# Patient Record
Sex: MALE | Race: BLACK OR AFRICAN AMERICAN | Employment: FULL TIME | URBAN - METROPOLITAN AREA
[De-identification: names, ages, dates, MRNs, and addresses within clinical notes are randomized per-mention and may not be internally consistent; named-entity substitution may affect disease eponyms.]

---

## 2017-03-28 ENCOUNTER — OFFICE VISIT (OUTPATIENT)
Dept: INTERNAL MEDICINE CLINIC | Age: 47
End: 2017-03-28

## 2017-03-28 VITALS
WEIGHT: 211 LBS | SYSTOLIC BLOOD PRESSURE: 120 MMHG | HEART RATE: 73 BPM | DIASTOLIC BLOOD PRESSURE: 70 MMHG | HEIGHT: 73 IN | OXYGEN SATURATION: 98 % | TEMPERATURE: 97.7 F | BODY MASS INDEX: 27.96 KG/M2 | RESPIRATION RATE: 16 BRPM

## 2017-03-28 DIAGNOSIS — E11.9 TYPE 2 DIABETES MELLITUS WITHOUT COMPLICATION, WITHOUT LONG-TERM CURRENT USE OF INSULIN (HCC): Primary | ICD-10-CM

## 2017-03-28 NOTE — MR AVS SNAPSHOT
Visit Information Date & Time Provider Department Dept. Phone Encounter #  
 3/28/2017 11:15 AM William Regalado MD [facility] [phone] [id number] Follow-up Instructions Return in about 3 months (around 6/28/2017). Follow-up and Disposition History Upcoming Health Maintenance Date Due Pneumococcal 19-64 Medium Risk (1 of 1 - PPSV23) 8/4/1987 EYE EXAM RETINAL OR DILATED Q1 4/16/2015 FOOT EXAM Q1 12/2/2016 MICROALBUMIN Q1 6/28/2017 LIPID PANEL Q1 6/28/2017 HEMOGLOBIN A1C Q6M 6/29/2017 DTaP/Tdap/Td series (2 - Td) 8/5/2020 Allergies as of 3/28/2017  Review Complete On: 3/28/2017 By: Nikki Carmichael Severity Noted Reaction Type Reactions Lipitor [Atorvastatin]  05/28/2015    Myalgia CK elevated. Current Immunizations  Reviewed on 8/5/2011 Name Date TDAP Vaccine 8/5/2010 Not reviewed this visit You Were Diagnosed With   
  
 Codes Comments Type 2 diabetes mellitus without complication, without long-term current use of insulin (HCC)    -  Primary ICD-10-CM: E11.9 ICD-9-CM: 250.00 Vitals BP Pulse Temp Resp Height(growth percentile) Weight(growth percentile) 120/70 (BP 1 Location: Left arm, BP Patient Position: At rest) 73 97.7 °F (36.5 °C) (Oral) 16 6' 1\" (1.854 m) 211 lb (95.7 kg) SpO2 BMI Smoking Status 98% 27.84 kg/m2 Never Smoker Vitals History BMI and BSA Data Body Mass Index Body Surface Area  
 27.84 kg/m 2 2.22 m 2 Preferred Pharmacy Pharmacy Name Phone 99 [facility], [facility] [phone] Your Updated Medication List  
  
   
This list is accurate as of: 3/28/17 11:49 AM.  Always use your most recent med list.  
  
  
  
  
 glimepiride 2 mg tablet Commonly known as:  AMARYL  
TAKE 1 TABLET BY MOUTH EVERY MORNING  
  
 glucose blood VI test strips strip Commonly known as:  ONETOUCH ULTRA TEST Check blood glucose once dialy  
  
 metFORMIN  mg tablet Commonly known as:  GLUCOPHAGE XR  
TAKE 4 TABLETS BY MOUTH DAILY WITH DINNER  
  
 multivitamin tablet Commonly known as:  ONE A DAY Take 1 Tab by mouth daily. valACYclovir 500 mg tablet Commonly known as:  VALTREX Take 1 Tab by mouth daily. VIAGRA 100 mg tablet Generic drug:  sildenafil citrate TAKE 1 TABLET BY MOUTH AS NEEDED -Do NOT take with nitrates or within 4 hours of an alpha BLOCKER We Performed the Following HEMOGLOBIN A1C WITH EAG [91803 CPT(R)] METABOLIC PANEL, COMPREHENSIVE [98556 CPT(R)] Follow-up Instructions Return in about 3 months (around 6/28/2017). Introducing [facility] SERVICES! Dear Hanny Rodas: Thank you for requesting a Widespace account. Our records indicate that you already have an active Widespace account. You can access your account anytime at [email or web address]/[email or web address] Did you know that you can access your hospital and ER discharge instructions at any time in Widespace? You can also review all of your test results from your hospital stay or ER visit. Additional Information If you have questions, please visit the Frequently Asked Questions section of the Widespace website at [email or web address] Remember, Widespace is NOT to be used for urgent needs. For medical emergencies, dial 911. Now available from your iPhone and Android! Please provide this summary of care documentation to your next provider. Your primary care clinician is listed as [phone] [address] [place] [address] [address]. If you have any questions after today's visit, please call [phone].

## 2017-03-28 NOTE — PROGRESS NOTES
HISTORY OF PRESENT ILLNESS  Franc Wheeler is a 48 y.o. male. HPI  Here for dm. He is well controlled on oral agents. He is active. He runs daily. Past Medical History:   Diagnosis Date    Acute superficial venous thrombosis of lower extremity 8/23/13    LLE, extensive to greater saphenous vein    Diabetes (HCC)     Genital HSV     Hypercholesterolemia      Current Outpatient Prescriptions   Medication Sig    glimepiride (AMARYL) 2 mg tablet TAKE 1 TABLET BY MOUTH EVERY MORNING    VIAGRA 100 mg tablet TAKE 1 TABLET BY MOUTH AS NEEDED -Do NOT take with nitrates or within 4 hours of an alpha BLOCKER    metFORMIN ER (GLUCOPHAGE XR) 500 mg tablet TAKE 4 TABLETS BY MOUTH DAILY WITH DINNER    multivitamin (ONE A DAY) tablet Take 1 Tab by mouth daily.  valACYclovir (VALTREX) 500 mg tablet Take 1 Tab by mouth daily.  glucose blood VI test strips (ONE TOUCH ULTRA TEST) strip Check blood glucose once dialy     No current facility-administered medications for this visit. Review of Systems   All other systems reviewed and are negative. Visit Vitals    /70 (BP 1 Location: Left arm, BP Patient Position: At rest)    Pulse 73    Temp 97.7 °F (36.5 °C) (Oral)    Resp 16    Ht 6' 1\" (1.854 m)    Wt 211 lb (95.7 kg)    SpO2 98%    BMI 27.84 kg/m2       Physical Exam   Constitutional: He appears well-developed and well-nourished. Nursing note and vitals reviewed. Lab Results   Component Value Date/Time    Hemoglobin A1c 6.6 12/29/2016 11:25 AM         ASSESSMENT and Nabeel Valverde was seen today for blood pressure check. Diagnoses and all orders for this visit:    Type 2 diabetes mellitus without complication, without long-term current use of insulin (HCC)  -     HEMOGLOBIN A1C WITH EAG  -     METABOLIC PANEL, COMPREHENSIVE  The current medical regimen is effective;  continue present plan and medications.     Reviewed plan of care with the patient who has provided input and agrees with the goals

## 2017-03-29 LAB
ALBUMIN SERPL-MCNC: 4.7 G/DL (ref 3.5–5.5)
ALBUMIN/GLOB SERPL: 1.7 {RATIO} (ref 1.2–2.2)
ALP SERPL-CCNC: 52 IU/L (ref 39–117)
ALT SERPL-CCNC: 35 IU/L (ref 0–44)
AST SERPL-CCNC: 21 IU/L (ref 0–40)
BILIRUB SERPL-MCNC: 1 MG/DL (ref 0–1.2)
BUN SERPL-MCNC: 11 MG/DL (ref 6–24)
BUN/CREAT SERPL: 12 (ref 9–20)
CALCIUM SERPL-MCNC: 9.5 MG/DL (ref 8.7–10.2)
CHLORIDE SERPL-SCNC: 98 MMOL/L (ref 96–106)
CO2 SERPL-SCNC: 25 MMOL/L (ref 18–29)
CREAT SERPL-MCNC: 0.92 MG/DL (ref 0.76–1.27)
EST. AVERAGE GLUCOSE BLD GHB EST-MCNC: 146 MG/DL
GLOBULIN SER CALC-MCNC: 2.7 G/DL (ref 1.5–4.5)
GLUCOSE SERPL-MCNC: 148 MG/DL (ref 65–99)
HBA1C MFR BLD: 6.7 % (ref 4.8–5.6)
POTASSIUM SERPL-SCNC: 4.6 MMOL/L (ref 3.5–5.2)
PROT SERPL-MCNC: 7.4 G/DL (ref 6–8.5)
SODIUM SERPL-SCNC: 138 MMOL/L (ref 134–144)

## 2017-05-22 RX ORDER — GLIMEPIRIDE 2 MG/1
TABLET ORAL
Qty: 90 TAB | Refills: 0 | Status: SHIPPED | OUTPATIENT
Start: 2017-05-22 | End: 2017-08-21 | Stop reason: SDUPTHER

## 2017-05-22 RX ORDER — METFORMIN HYDROCHLORIDE 500 MG/1
TABLET, EXTENDED RELEASE ORAL
Qty: 360 TAB | Refills: 0 | Status: SHIPPED | OUTPATIENT
Start: 2017-05-22 | End: 2017-08-21 | Stop reason: SDUPTHER

## 2017-06-28 ENCOUNTER — OFFICE VISIT (OUTPATIENT)
Dept: INTERNAL MEDICINE CLINIC | Age: 47
End: 2017-06-28

## 2017-06-28 VITALS
HEART RATE: 84 BPM | SYSTOLIC BLOOD PRESSURE: 110 MMHG | BODY MASS INDEX: 27.5 KG/M2 | TEMPERATURE: 97.6 F | DIASTOLIC BLOOD PRESSURE: 80 MMHG | OXYGEN SATURATION: 99 % | WEIGHT: 208.4 LBS

## 2017-06-28 DIAGNOSIS — E11.9 TYPE 2 DIABETES MELLITUS WITHOUT COMPLICATION, WITHOUT LONG-TERM CURRENT USE OF INSULIN (HCC): Primary | ICD-10-CM

## 2017-06-28 NOTE — PROGRESS NOTES
HISTORY OF PRESENT ILLNESS  Scar Noel is a 48 y.o. male. HPI  Here for dm. He is well controlled on oral agents. He is active running daily and is careful with diet. Past Medical History:   Diagnosis Date    Acute superficial venous thrombosis of lower extremity 8/23/13    LLE, extensive to greater saphenous vein    Diabetes (HCC)     Genital HSV     Hypercholesterolemia      Current Outpatient Prescriptions   Medication Sig    glimepiride (AMARYL) 2 mg tablet TAKE 1 TABLET BY MOUTH EVERY MORNING    metFORMIN ER (GLUCOPHAGE XR) 500 mg tablet TAKE 4 TABLETS BY MOUTH DAILY WITH DINNER    VIAGRA 100 mg tablet TAKE 1 TABLET BY MOUTH AS NEEDED -Do NOT take with nitrates or within 4 hours of an alpha BLOCKER    multivitamin (ONE A DAY) tablet Take 1 Tab by mouth daily.  valACYclovir (VALTREX) 500 mg tablet Take 1 Tab by mouth daily.  glucose blood VI test strips (ONE TOUCH ULTRA TEST) strip Check blood glucose once dialy     No current facility-administered medications for this visit. Review of Systems   All other systems reviewed and are negative. Visit Vitals    /80 (BP 1 Location: Left arm, BP Patient Position: At rest)    Pulse 84    Temp 97.6 °F (36.4 °C) (Oral)    Wt 208 lb 6.4 oz (94.5 kg)    SpO2 99%    BMI 27.5 kg/m2       Physical Exam   Constitutional: He appears well-developed and well-nourished. Psychiatric: He has a normal mood and affect. His behavior is normal. Thought content normal.   Nursing note and vitals reviewed. Lab Results   Component Value Date/Time    Hemoglobin A1c 6.7 03/28/2017 12:01 PM       ASSESSMENT and Alona Alpers was seen today for diabetes.     Diagnoses and all orders for this visit:    Type 2 diabetes mellitus without complication, without long-term current use of insulin (HCC)  -     HEMOGLOBIN A1C WITH EAG  -     METABOLIC PANEL, COMPREHENSIVE  -     MICROALBUMIN, UR, RAND W/ MICROALBUMIN/CREA RATIO  The current medical regimen is effective;  continue present plan and medications.       Reviewed plan of care with the patient who has provided input and agrees with the goals

## 2017-06-28 NOTE — PROGRESS NOTES
1. Have you been to the ER, urgent care clinic since your last visit? Hospitalized since your last visit? No    2. Have you seen or consulted any other health care providers outside of the [facility] [place] [facility]"" since your last visit? Include any pap smears or colon screening.  No   Chief Complaint   Patient presents with    Diabetes     3 months follow up     Fasting

## 2017-06-28 NOTE — MR AVS SNAPSHOT
Visit Information Date & Time Provider Department Dept. Phone Encounter #  
 6/28/2017 11:15 AM Noemi Ferro MD [facility] [phone] [id number] Follow-up Instructions Return in about 3 months (around 9/28/2017). Follow-up and Disposition History Upcoming Health Maintenance Date Due Pneumococcal 19-64 Medium Risk (1 of 1 - PPSV23) 8/4/1987 EYE EXAM RETINAL OR DILATED Q1 4/16/2015 FOOT EXAM Q1 12/2/2016 MICROALBUMIN Q1 6/28/2017 LIPID PANEL Q1 6/28/2017 INFLUENZA AGE 9 TO ADULT 8/1/2017 HEMOGLOBIN A1C Q6M 9/28/2017 DTaP/Tdap/Td series (2 - Td) 8/5/2020 Allergies as of 6/28/2017  Review Complete On: 6/28/2017 By: Sangeeta Suresh Severity Noted Reaction Type Reactions Lipitor [Atorvastatin]  05/28/2015    Myalgia CK elevated. Current Immunizations  Reviewed on 8/5/2011 Name Date TDAP Vaccine 8/5/2010 Not reviewed this visit You Were Diagnosed With   
  
 Codes Comments Type 2 diabetes mellitus without complication, without long-term current use of insulin (HCC)    -  Primary ICD-10-CM: E11.9 ICD-9-CM: 250.00 Vitals BP Pulse Temp Weight(growth percentile) SpO2 BMI  
 110/80 (BP 1 Location: Left arm, BP Patient Position: At rest) 84 97.6 °F (36.4 °C) (Oral) 208 lb 6.4 oz (94.5 kg) 99% 27.5 kg/m2 Smoking Status Never Smoker Vitals History BMI and BSA Data Body Mass Index Body Surface Area  
 27.5 kg/m 2 2.21 m 2 Preferred Pharmacy Pharmacy Name Phone [address] [place] [address], [facility] [phone] Your Updated Medication List  
  
   
This list is accurate as of: 6/28/17 11:28 AM.  Always use your most recent med list.  
  
  
  
  
 glimepiride 2 mg tablet Commonly known as:  AMARYL  
TAKE 1 TABLET BY MOUTH EVERY MORNING  
  
 glucose blood VI test strips strip Commonly known as:  ONETOUCH ULTRA TEST Check blood glucose once dialy  
  
 metFORMIN  mg tablet Commonly known as:  GLUCOPHAGE XR  
TAKE 4 TABLETS BY MOUTH DAILY WITH DINNER  
  
 multivitamin tablet Commonly known as:  ONE A DAY Take 1 Tab by mouth daily. valACYclovir 500 mg tablet Commonly known as:  VALTREX Take 1 Tab by mouth daily. VIAGRA 100 mg tablet Generic drug:  sildenafil citrate TAKE 1 TABLET BY MOUTH AS NEEDED -Do NOT take with nitrates or within 4 hours of an alpha BLOCKER We Performed the Following HEMOGLOBIN A1C WITH EAG [63090 CPT(R)] METABOLIC PANEL, COMPREHENSIVE [39515 CPT(R)] MICROALBUMIN, UR, RAND W/ MICROALBUMIN/CREA RATIO [id number] CPT(R)] Follow-up Instructions Return in about 3 months (around 9/28/2017). Introducing [facility] & HEALTH SERVICES! Dear Pola Vicente: Thank you for requesting a Pocits account. Our records indicate that you already have an active Pocits account. You can access your account anytime at [email or web address] Did you know that you can access your hospital and ER discharge instructions at any time in Pocits? You can also review all of your test results from your hospital stay or ER visit. Additional Information If you have questions, please visit the Frequently Asked Questions section of the Pocits website at [email or web address] Remember, Pocits is NOT to be used for urgent needs. For medical emergencies, dial 911. Now available from your iPhone and Android! Please provide this summary of care documentation to your next provider. Your primary care clinician is listed as [phone] [address] [place] [address] [address]. If you have any questions after today's visit, please call [phone].

## 2017-06-29 LAB
ALBUMIN SERPL-MCNC: 4.8 G/DL (ref 3.5–5.5)
ALBUMIN/CREAT UR: 8.1 MG/G CREAT (ref 0–30)
ALBUMIN/GLOB SERPL: 1.7 {RATIO} (ref 1.2–2.2)
ALP SERPL-CCNC: 57 IU/L (ref 39–117)
ALT SERPL-CCNC: 18 IU/L (ref 0–44)
AST SERPL-CCNC: 20 IU/L (ref 0–40)
BILIRUB SERPL-MCNC: 0.6 MG/DL (ref 0–1.2)
BUN SERPL-MCNC: 14 MG/DL (ref 6–24)
BUN/CREAT SERPL: 15 (ref 9–20)
CALCIUM SERPL-MCNC: 9.6 MG/DL (ref 8.7–10.2)
CHLORIDE SERPL-SCNC: 98 MMOL/L (ref 96–106)
CO2 SERPL-SCNC: 23 MMOL/L (ref 18–29)
CREAT SERPL-MCNC: 0.93 MG/DL (ref 0.76–1.27)
CREAT UR-MCNC: 270.7 MG/DL
EST. AVERAGE GLUCOSE BLD GHB EST-MCNC: 148 MG/DL
GLOBULIN SER CALC-MCNC: 2.8 G/DL (ref 1.5–4.5)
GLUCOSE SERPL-MCNC: 134 MG/DL (ref 65–99)
HBA1C MFR BLD: 6.8 % (ref 4.8–5.6)
MICROALBUMIN UR-MCNC: 21.9 UG/ML
POTASSIUM SERPL-SCNC: 4.4 MMOL/L (ref 3.5–5.2)
PROT SERPL-MCNC: 7.6 G/DL (ref 6–8.5)
SODIUM SERPL-SCNC: 139 MMOL/L (ref 134–144)

## 2017-08-22 RX ORDER — METFORMIN HYDROCHLORIDE 500 MG/1
TABLET, EXTENDED RELEASE ORAL
Qty: 180 TAB | Refills: 1 | Status: SHIPPED | OUTPATIENT
Start: 2017-08-22 | End: 2017-12-04 | Stop reason: SDUPTHER

## 2017-08-22 RX ORDER — GLIMEPIRIDE 2 MG/1
TABLET ORAL
Qty: 90 TAB | Refills: 1 | Status: SHIPPED | OUTPATIENT
Start: 2017-08-22 | End: 2018-03-16 | Stop reason: SDUPTHER

## 2017-10-02 ENCOUNTER — OFFICE VISIT (OUTPATIENT)
Dept: INTERNAL MEDICINE CLINIC | Age: 47
End: 2017-10-02

## 2017-10-02 VITALS
DIASTOLIC BLOOD PRESSURE: 70 MMHG | WEIGHT: 221.8 LBS | TEMPERATURE: 97.7 F | HEIGHT: 73 IN | SYSTOLIC BLOOD PRESSURE: 113 MMHG | BODY MASS INDEX: 29.4 KG/M2 | OXYGEN SATURATION: 96 % | RESPIRATION RATE: 16 BRPM | HEART RATE: 79 BPM

## 2017-10-02 DIAGNOSIS — E11.9 TYPE 2 DIABETES MELLITUS WITHOUT COMPLICATION, WITHOUT LONG-TERM CURRENT USE OF INSULIN (HCC): Primary | ICD-10-CM

## 2017-10-02 DIAGNOSIS — Z78.9 STATIN INTOLERANCE: ICD-10-CM
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
## 2017-10-02 NOTE — PROGRESS NOTES
HISTORY OF PRESENT ILLNESS  Gisele Mota is a 50 y.o. male. HPI  Here for DM. He is well controlled on oral agents due for labs. He is statin intolerant . He is active but not running again yet. He has loosened up his diet some.,  Past Medical History:   Diagnosis Date    Acute superficial venous thrombosis of lower extremity 8/23/13    LLE, extensive to greater saphenous vein    Diabetes (HCC)     Genital HSV     Hypercholesterolemia      Current Outpatient Prescriptions   Medication Sig    glimepiride (AMARYL) 2 mg tablet TAKE 1 TABLET BY MOUTH EVERY MORNING    metFORMIN ER (GLUCOPHAGE XR) 500 mg tablet TAKE 4 TABLETS BY MOUTH DAILY WITH DINNER    VIAGRA 100 mg tablet TAKE 1 TABLET BY MOUTH AS NEEDED -Do NOT take with nitrates or within 4 hours of an alpha BLOCKER    valACYclovir (VALTREX) 500 mg tablet Take 1 Tab by mouth daily.  glucose blood VI test strips (ONE TOUCH ULTRA TEST) strip Check blood glucose once dialy    multivitamin (ONE A DAY) tablet Take 1 Tab by mouth daily. No current facility-administered medications for this visit. Review of Systems   All other systems reviewed and are negative. Visit Vitals    /70 (BP 1 Location: Left arm, BP Patient Position: At rest)    Pulse 79    Temp 97.7 °F (36.5 °C) (Oral)    Resp 16    Ht 6' 1\" (1.854 m)    Wt 221 lb 12.8 oz (100.6 kg)    SpO2 96%    BMI 29.26 kg/m2       Physical Exam   Constitutional: He appears well-developed and well-nourished. Musculoskeletal:   Foot exam - bilateral normal; no swelling, tenderness or skin or vascular lesions. Color and temperature is normal. Sensation is intact. Peripheral pulses are palpable. Toenails are normal.     Nursing note and vitals reviewed. Lab Results   Component Value Date/Time    Hemoglobin A1c 6.8 06/28/2017 11:38 AM       ASSESSMENT and PLAN  Diagnoses and all orders for this visit:    1.  Type 2 diabetes mellitus without complication, without long-term current use of insulin (HCC)  -     HEMOGLOBIN A1C WITH EAG  -     METABOLIC PANEL, COMPREHENSIVE  -     REFERRAL TO OPHTHALMOLOGY  -      DIABETES FOOT EXAM  The current medical regimen is effective;  continue present plan and medications.     2. Statin intolerance    Reviewed plan of care with the patient who has provided input and agrees with the goals

## 2017-10-02 NOTE — MR AVS SNAPSHOT
Visit Information Date & Time Provider Department Dept. Phone Encounter #  
 10/2/2017 10:45 AM Leigh Michele MD [facility] [phone] [id number] Follow-up Instructions Return in about 3 months (around 1/2/2018). Upcoming Health Maintenance Date Due Pneumococcal 19-64 Medium Risk (1 of 1 - PPSV23) 8/4/1987 EYE EXAM RETINAL OR DILATED Q1 4/16/2015 FOOT EXAM Q1 12/2/2016 LIPID PANEL Q1 6/28/2017 INFLUENZA AGE 9 TO ADULT 8/1/2017 HEMOGLOBIN A1C Q6M 12/28/2017 MICROALBUMIN Q1 6/28/2018 DTaP/Tdap/Td series (2 - Td) 8/5/2020 Allergies as of 10/2/2017  Review Complete On: 10/2/2017 By: Miller Parnell Severity Noted Reaction Type Reactions Lipitor [Atorvastatin]  05/28/2015    Myalgia CK elevated. Current Immunizations  Reviewed on 8/5/2011 Name Date TDAP Vaccine 8/5/2010 Not reviewed this visit You Were Diagnosed With   
  
 Codes Comments Type 2 diabetes mellitus without complication, without long-term current use of insulin (HCC)    -  Primary ICD-10-CM: E11.9 ICD-9-CM: 250.00 Statin intolerance     ICD-10-CM: Z78.9 ICD-9-CM: 995.27 Vitals BP Pulse Temp Resp Height(growth percentile) Weight(growth percentile) 133/87 (BP 1 Location: Left arm, BP Patient Position: At rest) 79 97.7 °F (36.5 °C) (Oral) 16 6' 1\" (1.854 m) 221 lb 12.8 oz (100.6 kg) SpO2 BMI Smoking Status 96% 29.26 kg/m2 Never Smoker BMI and BSA Data Body Mass Index Body Surface Area  
 29.26 kg/m 2 2.28 m 2 Preferred Pharmacy Pharmacy Name Phone 99 [facility], [facility] [phone] Your Updated Medication List  
  
   
This list is accurate as of: 10/2/17 11:03 AM.  Always use your most recent med list.  
  
  
  
  
 glimepiride 2 mg tablet Commonly known as:  AMARYL  
TAKE 1 TABLET BY MOUTH EVERY MORNING  
  
 glucose blood VI test strips strip Commonly known as:  ONETOUCH ULTRA TEST Check blood glucose once dialy  
  
 metFORMIN  mg tablet Commonly known as:  GLUCOPHAGE XR  
TAKE 4 TABLETS BY MOUTH DAILY WITH DINNER  
  
 multivitamin tablet Commonly known as:  ONE A DAY Take 1 Tab by mouth daily. valACYclovir 500 mg tablet Commonly known as:  VALTREX Take 1 Tab by mouth daily. VIAGRA 100 mg tablet Generic drug:  sildenafil citrate TAKE 1 TABLET BY MOUTH AS NEEDED -Do NOT take with nitrates or within 4 hours of an alpha BLOCKER We Performed the Following HEMOGLOBIN A1C WITH EAG [56931 CPT(R)]  DIABETES FOOT EXAM [HM7 Custom] METABOLIC PANEL, COMPREHENSIVE [06940 CPT(R)] REFERRAL TO OPHTHALMOLOGY [REF57 Custom] Follow-up Instructions Return in about 3 months (around 1/2/2018). Referral Information Referral ID Referred By Referred To  
  
 [id number] Jacque Lindsey Not Available Visits Status Start Date End Date 1 New Request 10/2/17 10/2/18 If your referral has a status of pending review or denied, additional information will be sent to support the outcome of this decision. Introducing [facility] & HEALTH SERVICES! Dear Lilliam Irizarry: Thank you for requesting a TheShoppingPro account. Our records indicate that you already have an active TheShoppingPro account. You can access your account anytime at [email or web address] Did you know that you can access your hospital and ER discharge instructions at any time in TheShoppingPro? You can also review all of your test results from your hospital stay or ER visit. Additional Information If you have questions, please visit the Frequently Asked Questions section of the TheShoppingPro website at [email or web address] Remember, TheShoppingPro is NOT to be used for urgent needs. For medical emergencies, dial 911. Now available from your iPhone and Android! Please provide this summary of care documentation to your next provider. Your primary care clinician is listed as [phone] [address] [place] [address] [address]. If you have any questions after today's visit, please call [phone].

## 2017-10-03 LAB
ALBUMIN SERPL-MCNC: 4.6 G/DL (ref 3.5–5.5)
ALBUMIN/GLOB SERPL: 1.7 {RATIO} (ref 1.2–2.2)
ALP SERPL-CCNC: 54 IU/L (ref 39–117)
ALT SERPL-CCNC: 23 IU/L (ref 0–44)
AST SERPL-CCNC: 22 IU/L (ref 0–40)
BILIRUB SERPL-MCNC: 0.9 MG/DL (ref 0–1.2)
BUN SERPL-MCNC: 16 MG/DL (ref 6–24)
BUN/CREAT SERPL: 16 (ref 9–20)
CALCIUM SERPL-MCNC: 9.3 MG/DL (ref 8.7–10.2)
CHLORIDE SERPL-SCNC: 101 MMOL/L (ref 96–106)
CO2 SERPL-SCNC: 24 MMOL/L (ref 18–29)
CREAT SERPL-MCNC: 1 MG/DL (ref 0.76–1.27)
EST. AVERAGE GLUCOSE BLD GHB EST-MCNC: 146 MG/DL
GLOBULIN SER CALC-MCNC: 2.7 G/DL (ref 1.5–4.5)
GLUCOSE SERPL-MCNC: 169 MG/DL (ref 65–99)
HBA1C MFR BLD: 6.7 % (ref 4.8–5.6)
POTASSIUM SERPL-SCNC: 4.5 MMOL/L (ref 3.5–5.2)
PROT SERPL-MCNC: 7.3 G/DL (ref 6–8.5)
SODIUM SERPL-SCNC: 140 MMOL/L (ref 134–144)

## 2017-12-04 RX ORDER — METFORMIN HYDROCHLORIDE 500 MG/1
TABLET, EXTENDED RELEASE ORAL
Qty: 360 TAB | Refills: 3 | Status: SHIPPED | OUTPATIENT
Start: 2017-12-04 | End: 2018-12-21 | Stop reason: SDUPTHER

## 2017-12-04 RX ORDER — SILDENAFIL CITRATE 100 MG/1
TABLET, FILM COATED ORAL
Qty: 15 TAB | Refills: 10 | Status: SHIPPED | OUTPATIENT
Start: 2017-12-04 | End: 2018-03-27 | Stop reason: SDUPTHER

## 2018-03-19 RX ORDER — GLIMEPIRIDE 2 MG/1
TABLET ORAL
Qty: 90 TAB | Refills: 1 | Status: SHIPPED | OUTPATIENT
Start: 2018-03-19 | End: 2018-08-29 | Stop reason: SDUPTHER

## 2018-03-27 ENCOUNTER — OFFICE VISIT (OUTPATIENT)
Dept: INTERNAL MEDICINE CLINIC | Age: 48
End: 2018-03-27

## 2018-03-27 VITALS
HEART RATE: 90 BPM | HEIGHT: 73 IN | SYSTOLIC BLOOD PRESSURE: 114 MMHG | WEIGHT: 223 LBS | OXYGEN SATURATION: 98 % | RESPIRATION RATE: 16 BRPM | DIASTOLIC BLOOD PRESSURE: 78 MMHG | BODY MASS INDEX: 29.55 KG/M2

## 2018-03-27 DIAGNOSIS — Z12.5 ENCOUNTER FOR PROSTATE CANCER SCREENING: ICD-10-CM

## 2018-03-27 DIAGNOSIS — M79.10 MYALGIA: ICD-10-CM

## 2018-03-27 DIAGNOSIS — E11.9 TYPE 2 DIABETES MELLITUS WITHOUT COMPLICATION, WITHOUT LONG-TERM CURRENT USE OF INSULIN (HCC): ICD-10-CM

## 2018-03-27 DIAGNOSIS — Z78.9 STATIN INTOLERANCE: Primary | ICD-10-CM

## 2018-03-27 DIAGNOSIS — N52.1 ERECTILE DYSFUNCTION DUE TO DISEASES CLASSIFIED ELSEWHERE: ICD-10-CM

## 2018-03-27 RX ORDER — SILDENAFIL 100 MG/1
TABLET, FILM COATED ORAL
Qty: 50 TAB | Refills: 3 | Status: SHIPPED | OUTPATIENT
Start: 2018-03-27 | End: 2019-04-09 | Stop reason: SDUPTHER

## 2018-03-27 NOTE — PROGRESS NOTES
Chief Complaint   Patient presents with    Follow-up     3 mon    Ear Fullness     Diabetic ROS - medication compliance: compliant all of the time, diabetic diet compliance: compliant all of the time, home glucose monitoring: is performed regularly. New concerns: had ear surgery last year. Diabetic exam: heart sounds normal rate, regular rhythm, normal S1, S2, no murmurs, rubs, clicks or gallops. Lab review:   Results for orders placed or performed in visit on 10/02/17   HEMOGLOBIN A1C WITH EAG   Result Value Ref Range    Hemoglobin A1c 6.7 (H) 4.8 - 5.6 %    Estimated average glucose 242 mg/dL   METABOLIC PANEL, COMPREHENSIVE   Result Value Ref Range    Glucose 169 (H) 65 - 99 mg/dL    BUN 16 6 - 24 mg/dL    Creatinine 1.00 0.76 - 1.27 mg/dL    GFR est non-AA 88 >59 mL/min/1.73    GFR est  >59 mL/min/1.73    BUN/Creatinine ratio 16 9 - 20    Sodium 140 134 - 144 mmol/L    Potassium 4.5 3.5 - 5.2 mmol/L    Chloride 101 96 - 106 mmol/L    CO2 24 18 - 29 mmol/L    Calcium 9.3 8.7 - 10.2 mg/dL    Protein, total 7.3 6.0 - 8.5 g/dL    Albumin 4.6 3.5 - 5.5 g/dL    GLOBULIN, TOTAL 2.7 1.5 - 4.5 g/dL    A-G Ratio 1.7 1.2 - 2.2    Bilirubin, total 0.9 0.0 - 1.2 mg/dL    Alk. phosphatase 54 39 - 117 IU/L    AST (SGOT) 22 0 - 40 IU/L    ALT (SGPT) 23 0 - 44 IU/L     . Assessment: Diabetes Mellitus: stable. Plan: See orders for this visit as documented in the electronic medical record. Diabetic issues reviewed with him: diabetic diet discussed in detail, written exchange diet given, low cholesterol diet, weight control and daily exercise discussed and home glucose monitoring emphasized. Lab Results   Component Value Date/Time    Hemoglobin A1c 6.7 (H) 10/02/2017 11:18 AM     See ENT as issues post op require the help of ENT      Diagnoses and all orders for this visit:    1. Statin intolerance    2. Myalgia    3.  Type 2 diabetes mellitus without complication, without long-term current use of insulin ([facility] [address]  -     LIPID PANEL  -     CBC WITH AUTOMATED DIFF  -     METABOLIC PANEL, COMPREHENSIVE  -     MICROALBUMIN, UR, RAND W/ MICROALB/CREAT RATIO    4. Erectile dysfunction due to diseases classified elsewhere    5.  Encounter for prostate cancer screening  -     PSA W/ REFLX FREE PSA    Other orders  -     sildenafil citrate (VIAGRA) 100 mg tablet; TAKE 1 TABLET BY MOUTH AS NEEDED -Do NOT take with nitrates or within 4 hours of an alpha BLOCKER

## 2018-03-27 NOTE — PROGRESS NOTES
Coordination of Care Questions    1. Have you been to the ER, urgent care clinic since your last visit? No       Hospitalized since your last visit? No    2. Have you seen or consulted any other health care providers outside of the [address] [place] [address] since your last visit? Include any pap smears or colon screening.  No

## 2018-03-27 NOTE — MR AVS SNAPSHOT
[address] [place] [address] [address] 
[phone] Patient: Gina Ayala. MRN:  Anand Masters Visit Information Date & Time Provider Department Dept. Phone Encounter #  
 3/27/2018  2:00 PM Lionel Huff MD [facility] [phone] [id number] Your Appointments 6/26/2018 11:00 AM  
ROUTINE CARE with Lionel Huff MD  
[facility] Appt Note: R F/U  
 [place] [address] [facility] [address]  
[address] [facility] 121 [facility] 7 [address] Upcoming Health Maintenance Date Due Pneumococcal 19-64 Medium Risk (1 of 1 - PPSV23) 8/4/1987 EYE EXAM RETINAL OR DILATED Q1 4/16/2015 LIPID PANEL Q1 6/28/2017 Influenza Age 5 to Adult 8/1/2017 HEMOGLOBIN A1C Q6M 4/2/2018 MICROALBUMIN Q1 6/28/2018 FOOT EXAM Q1 10/2/2018 DTaP/Tdap/Td series (2 - Td) 8/5/2020 Allergies as of 3/27/2018  Review Complete On: 3/27/2018 By: Freda Sullivan LPN Severity Noted Reaction Type Reactions Lipitor [Atorvastatin]  05/28/2015    Myalgia CK elevated. Current Immunizations  Reviewed on 8/5/2011 Name Date TDAP Vaccine 8/5/2010 Not reviewed this visit You Were Diagnosed With   
  
 Codes Comments Statin intolerance    -  Primary ICD-10-CM: Z78.9 ICD-9-CM: 995.27 Myalgia     ICD-10-CM: M79.1 ICD-9-CM: 729.1 Type 2 diabetes mellitus without complication, without long-term current use of insulin (HCC)     ICD-10-CM: E11.9 ICD-9-CM: 250.00 Erectile dysfunction due to diseases classified elsewhere     ICD-10-CM: N52.1 ICD-9-CM: 607.84 Encounter for prostate cancer screening     ICD-10-CM: Z12.5 ICD-9-CM: V76.44 Vitals BP Pulse Resp Height(growth percentile) Weight(growth percentile) SpO2 114/78 90 16 6' 1\" (1.854 m) 223 lb (101.2 kg) 98% BMI Smoking Status 29.42 kg/m2 Never Smoker Vitals History BMI and BSA Data Body Mass Index Body Surface Area  
 29.42 kg/m 2 2.28 m 2 Preferred Pharmacy Pharmacy Name Phone [facility], [facility] [place] Mar [phone] Your Updated Medication List  
  
   
This list is accurate as of 3/27/18  2:46 PM.  Always use your most recent med list.  
  
  
  
  
 glimepiride 2 mg tablet Commonly known as:  AMARYL  
TAKE 1 TABLET BY MOUTH EVERY MORNING  
  
 glucose blood VI test strips strip Commonly known as:  ONETOUCH ULTRA TEST Check blood glucose once dialy  
  
 metFORMIN  mg tablet Commonly known as:  GLUCOPHAGE XR  
TAKE 4 TABLETS BY MOUTH DAILY WITH DINNER  
  
 multivitamin tablet Commonly known as:  ONE A DAY Take 1 Tab by mouth daily. sildenafil citrate 100 mg tablet Commonly known as:  VIAGRA TAKE 1 TABLET BY MOUTH AS NEEDED -Do NOT take with nitrates or within 4 hours of an alpha BLOCKER  
  
 valACYclovir 500 mg tablet Commonly known as:  VALTREX Take 1 Tab by mouth daily. Prescriptions Sent to Pharmacy Refills  
 sildenafil citrate (VIAGRA) 100 mg tablet 3 Sig: TAKE 1 TABLET BY MOUTH AS NEEDED -Do NOT take with nitrates or within 4 hours of an alpha BLOCKER Class: Normal  
 Pharmacy: [address] [place] [address] 43, [place]  #: [phone] We Performed the Following CBC WITH AUTOMATED DIFF [42654 CPT(R)] LIPID PANEL [84277 CPT(R)] METABOLIC PANEL, COMPREHENSIVE [86361 CPT(R)] MICROALBUMIN, UR, RAND W/ MICROALB/CREAT RATIO A1305406 CPT(R)] PSA W/ REFLX FREE PSA [98103 CPT(R)] Introducing [facility]! Dear Robyn Moreno: Thank you for requesting a NephroGenex account. Our records indicate that you already have an active NephroGenex account.   You can access your account anytime at [email or web address] Did you know that you can access your hospital and ER discharge instructions at any time in Storm Exchange? You can also review all of your test results from your hospital stay or ER visit. Additional Information If you have questions, please visit the Frequently Asked Questions section of the Storm Exchange website at [email or web address] Remember, Storm Exchange is NOT to be used for urgent needs. For medical emergencies, dial 911. Now available from your iPhone and Android! Please provide this summary of care documentation to your next provider. Your primary care clinician is listed as Baldo Perfect. If you have any questions after today's visit, please call [phone].

## 2018-03-28 LAB
ALBUMIN SERPL-MCNC: 4.7 G/DL (ref 3.5–5.5)
ALBUMIN/CREAT UR: 16.2 MG/G CREAT (ref 0–30)
ALBUMIN/GLOB SERPL: 1.6 {RATIO} (ref 1.2–2.2)
ALP SERPL-CCNC: 57 IU/L (ref 39–117)
ALT SERPL-CCNC: 36 IU/L (ref 0–44)
AST SERPL-CCNC: 25 IU/L (ref 0–40)
BASOPHILS # BLD AUTO: 0.1 X10E3/UL (ref 0–0.2)
BASOPHILS NFR BLD AUTO: 1 %
BILIRUB SERPL-MCNC: 1 MG/DL (ref 0–1.2)
BUN SERPL-MCNC: 13 MG/DL (ref 6–24)
BUN/CREAT SERPL: 15 (ref 9–20)
CALCIUM SERPL-MCNC: 9.7 MG/DL (ref 8.7–10.2)
CHLORIDE SERPL-SCNC: 95 MMOL/L (ref 96–106)
CHOLEST SERPL-MCNC: 225 MG/DL (ref 100–199)
CO2 SERPL-SCNC: 25 MMOL/L (ref 18–29)
CREAT SERPL-MCNC: 0.86 MG/DL (ref 0.76–1.27)
CREAT UR-MCNC: 320.3 MG/DL
EOSINOPHIL # BLD AUTO: 0.2 X10E3/UL (ref 0–0.4)
EOSINOPHIL NFR BLD AUTO: 2 %
ERYTHROCYTE [DISTWIDTH] IN BLOOD BY AUTOMATED COUNT: 13.8 % (ref 12.3–15.4)
GFR SERPLBLD CREATININE-BSD FMLA CKD-EPI: 102 ML/MIN/1.73
GFR SERPLBLD CREATININE-BSD FMLA CKD-EPI: 118 ML/MIN/1.73
GLOBULIN SER CALC-MCNC: 2.9 G/DL (ref 1.5–4.5)
GLUCOSE SERPL-MCNC: 198 MG/DL (ref 65–99)
HCT VFR BLD AUTO: 44.7 % (ref 37.5–51)
HDLC SERPL-MCNC: 40 MG/DL
HGB BLD-MCNC: 14.7 G/DL (ref 13–17.7)
IMM GRANULOCYTES # BLD: 0 X10E3/UL (ref 0–0.1)
IMM GRANULOCYTES NFR BLD: 0 %
INTERPRETATION, 910389: NORMAL
LDLC SERPL CALC-MCNC: 142 MG/DL (ref 0–99)
LYMPHOCYTES # BLD AUTO: 3.5 X10E3/UL (ref 0.7–3.1)
LYMPHOCYTES NFR BLD AUTO: 38 %
Lab: NORMAL
MCH RBC QN AUTO: 27.7 PG (ref 26.6–33)
MCHC RBC AUTO-ENTMCNC: 32.9 G/DL (ref 31.5–35.7)
MCV RBC AUTO: 84 FL (ref 79–97)
MICROALBUMIN UR-MCNC: 51.8 UG/ML
MONOCYTES # BLD AUTO: 0.7 X10E3/UL (ref 0.1–0.9)
MONOCYTES NFR BLD AUTO: 7 %
NEUTROPHILS # BLD AUTO: 4.8 X10E3/UL (ref 1.4–7)
NEUTROPHILS NFR BLD AUTO: 52 %
PLATELET # BLD AUTO: 376 X10E3/UL (ref 150–379)
POTASSIUM SERPL-SCNC: 4.4 MMOL/L (ref 3.5–5.2)
PROT SERPL-MCNC: 7.6 G/DL (ref 6–8.5)
PSA SERPL-MCNC: 1.4 NG/ML (ref 0–4)
RBC # BLD AUTO: 5.3 X10E6/UL (ref 4.14–5.8)
REFLEX CRITERIA: NORMAL
SODIUM SERPL-SCNC: 138 MMOL/L (ref 134–144)
TRIGL SERPL-MCNC: 215 MG/DL (ref 0–149)
VLDLC SERPL CALC-MCNC: 43 MG/DL (ref 5–40)
WBC # BLD AUTO: 9.2 X10E3/UL (ref 3.4–10.8)

## 2018-06-26 ENCOUNTER — OFFICE VISIT (OUTPATIENT)
Dept: INTERNAL MEDICINE CLINIC | Age: 48
End: 2018-06-26

## 2018-06-26 VITALS
RESPIRATION RATE: 18 BRPM | SYSTOLIC BLOOD PRESSURE: 125 MMHG | WEIGHT: 220.8 LBS | TEMPERATURE: 98.4 F | HEIGHT: 73 IN | DIASTOLIC BLOOD PRESSURE: 80 MMHG | HEART RATE: 83 BPM | BODY MASS INDEX: 29.26 KG/M2 | OXYGEN SATURATION: 98 %

## 2018-06-26 DIAGNOSIS — E11.9 CONTROLLED TYPE 2 DIABETES MELLITUS WITHOUT COMPLICATION, WITHOUT LONG-TERM CURRENT USE OF INSULIN (HCC): Primary | ICD-10-CM

## 2018-06-26 DIAGNOSIS — E78.00 HYPERCHOLESTEROLEMIA: ICD-10-CM

## 2018-06-26 LAB — HBA1C MFR BLD HPLC: 7.6 %

## 2018-06-26 RX ORDER — GUAIFENESIN 100 MG/5ML
81 LIQUID (ML) ORAL DAILY
Qty: 100 TAB | Refills: 6 | Status: SHIPPED | OUTPATIENT
Start: 2018-06-26 | End: 2019-10-07 | Stop reason: SDUPTHER

## 2018-06-26 RX ORDER — PRAVASTATIN SODIUM 20 MG/1
20 TABLET ORAL
Qty: 90 TAB | Refills: 3 | Status: SHIPPED | OUTPATIENT
Start: 2018-06-26 | End: 2019-06-06 | Stop reason: SDUPTHER

## 2018-06-26 NOTE — MR AVS SNAPSHOT
[address] [place] [address] Drive [address] 350 [facility] 
[phone] Patient: Edson Acosta. MRN:  Sadaf Cruz Visit Information Date & Time Provider Department Dept. Phone Encounter #  
 6/26/2018 11:00 AM Chico Harvey MD [facility] [phone] [id number] Upcoming Health Maintenance Date Due Pneumococcal 19-64 Medium Risk (1 of 1 - PPSV23) 8/4/1987 EYE EXAM RETINAL OR DILATED Q1 4/16/2015 HEMOGLOBIN A1C Q6M 4/2/2018 Influenza Age 5 to Adult 8/1/2018 FOOT EXAM Q1 10/2/2018 MICROALBUMIN Q1 3/27/2019 LIPID PANEL Q1 3/27/2019 DTaP/Tdap/Td series (2 - Td) 8/5/2020 Allergies as of 6/26/2018  Review Complete On: 6/26/2018 By: Judith Selby, Certified Medical Assistant Severity Noted Reaction Type Reactions Lipitor [Atorvastatin]  05/28/2015    Myalgia CK elevated. Current Immunizations  Reviewed on 8/5/2011 Name Date TDAP Vaccine 8/5/2010 Not reviewed this visit You Were Diagnosed With   
  
 Codes Comments Controlled type 2 diabetes mellitus without complication, without long-term current use of insulin ([facility]    -  Primary ICD-10-CM: E11.9 ICD-9-CM: 250.00 Hypercholesterolemia     ICD-10-CM: E78.00 ICD-9-CM: 272.0 Vitals BP Pulse Temp Resp Height(growth percentile) Weight(growth percentile) 125/80 83 98.4 °F (36.9 °C) (Oral) 18 6' 1\" (1.854 m) 220 lb 12.8 oz (100.2 kg) SpO2 BMI Smoking Status 98% 29.13 kg/m2 Never Smoker Vitals History BMI and BSA Data Body Mass Index Body Surface Area  
 29.13 kg/m 2 2.27 m 2 Preferred Pharmacy Pharmacy Name Phone [address] [place] [address], [facility] [place] Mar [phone] Your Updated Medication List  
  
   
This list is accurate as of 6/26/18 11:52 AM.  Always use your most recent med list.  
  
  
  
  
 aspirin 81 mg chewable tablet Take 1 Tab by mouth daily. glimepiride 2 mg tablet Commonly known as:  AMARYL  
TAKE 1 TABLET BY MOUTH EVERY MORNING  
  
 glucose blood VI test strips strip Commonly known as:  ONETOUCH ULTRA TEST Check blood glucose once dialy  
  
 metFORMIN  mg tablet Commonly known as:  GLUCOPHAGE XR  
TAKE 4 TABLETS BY MOUTH DAILY WITH DINNER  
  
 pravastatin 20 mg tablet Commonly known as:  PRAVACHOL Take 1 Tab by mouth nightly. sildenafil citrate 100 mg tablet Commonly known as:  VIAGRA TAKE 1 TABLET BY MOUTH AS NEEDED -Do NOT take with nitrates or within 4 hours of an alpha BLOCKER  
  
 valACYclovir 500 mg tablet Commonly known as:  VALTREX Take 1 Tab by mouth daily. Prescriptions Sent to Pharmacy Refills  
 aspirin 81 mg chewable tablet 6 Sig: Take 1 Tab by mouth daily. Class: Normal  
 Pharmacy: [address] [place] [address] #: [phone] Route: Oral  
 pravastatin (PRAVACHOL) 20 mg tablet 3 Sig: Take 1 Tab by mouth nightly. Class: Normal  
 Pharmacy: [address] [place] [address] Ph #: [phone] Route: Oral  
  
We Performed the Following AMB POC HEMOGLOBIN A1C [29473 CPT(R)] Introducing [facility]! Dear Susan Marroquin: Thank you for requesting a RIGID account. Our records indicate that you already have an active RIGID account. You can access your account anytime at [email or web address] Did you know that you can access your hospital and ER discharge instructions at any time in RIGID? You can also review all of your test results from your hospital stay or ER visit. Additional Information If you have questions, please visit the Frequently Asked Questions section of the RIGID website at [email or web address] Remember, RIGID is NOT to be used for urgent needs.  For medical emergencies, dial 911. Now available from your iPhone and Android! Please provide this summary of care documentation to your next provider. Your primary care clinician is listed as [facility]. If you have any questions after today's visit, please call [phone].

## 2018-06-26 NOTE — PROGRESS NOTES
Chief Complaint   Patient presents with    Diabetes     Diabetic ROS - medication compliance: compliant all of the time, diabetic diet compliance: compliant all of the time, home glucose monitoring: is performed regularly. New concerns: skips breakfast often is adding glucerna in morning  No running for 6 months due to sore knee  Will restart soon    Diabetic exam: heart sounds normal rate, regular rhythm, normal S1, S2, no murmurs, rubs, clicks or gallops. Lab review:   Results for orders placed or performed in visit on 06/26/18   AMB POC HEMOGLOBIN A1C   Result Value Ref Range    Hemoglobin A1c (POC) 7.6 %     . Assessment: Diabetes Mellitus: not always as good diet and exercise reviewed   Plan: See orders for this visit as documented in the electronic medical record. Diabetic issues reviewed with him: diabetic diet discussed in detail, written exchange diet given, low cholesterol diet, weight control and daily exercise discussed and home glucose monitoring emphasized. Lab Results   Component Value Date/Time    Hemoglobin A1c 6.7 (H) 10/02/2017 11:18 AM    Hemoglobin A1c (POC) 7.6 06/26/2018 11:42 AM           Diagnoses and all orders for this visit:    1. Controlled type 2 diabetes mellitus without complication, without long-term current use of insulin (HCC)  -     AMB POC HEMOGLOBIN A1C    2. Hypercholesterolemia  -     pravastatin (PRAVACHOL) 20 mg tablet; Take 1 Tab by mouth nightly. Other orders  -     aspirin 81 mg chewable tablet; Take 1 Tab by mouth daily. Needs another try of statin      1. Controlled type 2 diabetes mellitus without complication, without long-term current use of insulin (HCC)  fair  - AMB POC HEMOGLOBIN A1C    2. Hypercholesterolemia  Add this  - pravastatin (PRAVACHOL) 20 mg tablet; Take 1 Tab by mouth nightly. Dispense: 90 Tab;  Refill: 3

## 2018-06-26 NOTE — PROGRESS NOTES
Davidemelina Hernandes. is a 50 y.o. male    Chief Complaint   Patient presents with    Diabetes     1. Have you been to the ER, urgent care clinic since your last visit? Hospitalized since your last visit? No     2. Have you seen or consulted any other health care providers outside of the [address] [place] [address] since your last visit? Include any pap smears or colon screening.   No     Visit Vitals    /82    Pulse 83    Temp 98.4 °F (36.9 °C) (Oral)    Resp 18    Ht 6' 1\" (1.854 m)    Wt 220 lb 12.8 oz (100.2 kg)    SpO2 98%    BMI 29.13 kg/m2

## 2018-08-30 RX ORDER — GLIMEPIRIDE 2 MG/1
TABLET ORAL
Qty: 90 TAB | Refills: 0 | Status: SHIPPED | OUTPATIENT
Start: 2018-08-30 | End: 2018-09-30 | Stop reason: SDUPTHER

## 2018-09-25 ENCOUNTER — OFFICE VISIT (OUTPATIENT)
Dept: INTERNAL MEDICINE CLINIC | Age: 48
End: 2018-09-25

## 2018-09-25 VITALS
DIASTOLIC BLOOD PRESSURE: 82 MMHG | OXYGEN SATURATION: 98 % | SYSTOLIC BLOOD PRESSURE: 132 MMHG | HEIGHT: 73 IN | BODY MASS INDEX: 30.09 KG/M2 | HEART RATE: 77 BPM | TEMPERATURE: 97.9 F | WEIGHT: 227 LBS | RESPIRATION RATE: 18 BRPM

## 2018-09-25 DIAGNOSIS — E11.9 TYPE 2 DIABETES MELLITUS WITHOUT COMPLICATION, WITHOUT LONG-TERM CURRENT USE OF INSULIN (HCC): Primary | ICD-10-CM

## 2018-09-25 DIAGNOSIS — Z12.11 COLON CANCER SCREENING: ICD-10-CM

## 2018-09-25 NOTE — MR AVS SNAPSHOT
[address] [place] [address] Drive [address] 350 [facility] 
[phone] Patient: Arnold Esparza. MRN:  Ofelia Daniela Visit Information Date & Time Provider Department Dept. Phone Encounter #  
 9/25/2018 10:30 AM Shelley Medina MD [facility] [phone] [id number] Upcoming Health Maintenance Date Due Pneumococcal 19-64 Medium Risk (1 of 1 - PPSV23) 8/4/1987 EYE EXAM RETINAL OR DILATED Q1 4/16/2015 Influenza Age 5 to Adult 8/1/2018 Shingrix Vaccine Age 50> (1 of 2) 8/4/2018 FOBT Q 1 YEAR AGE 50-75 8/4/2018 FOOT EXAM Q1 10/2/2018 HEMOGLOBIN A1C Q6M 12/26/2018 MICROALBUMIN Q1 3/27/2019 LIPID PANEL Q1 3/27/2019 DTaP/Tdap/Td series (2 - Td) 8/5/2020 Allergies as of 9/25/2018  Review Complete On: 9/25/2018 By: Maria Esther George LPN Severity Noted Reaction Type Reactions Lipitor [Atorvastatin]  05/28/2015    Myalgia CK elevated. Current Immunizations  Reviewed on 9/25/2018 Name Date TDAP Vaccine 8/5/2010 Reviewed by Shelley Medina MD on 9/25/2018 at 11:25 AM  
You Were Diagnosed With   
  
 Codes Comments Type 2 diabetes mellitus without complication, without long-term current use of insulin (HCC)    -  Primary ICD-10-CM: E11.9 ICD-9-CM: 250.00 Colon cancer screening     ICD-10-CM: Z12.11 ICD-9-CM: V76.51 Vitals BP Pulse Temp Resp Height(growth percentile) Weight(growth percentile) 132/82 77 97.9 °F (36.6 °C) (Oral) 18 6' 1\" (1.854 m) 227 lb (103 kg) SpO2 BMI Smoking Status 98% 29.95 kg/m2 Never Smoker Vitals History BMI and BSA Data Body Mass Index Body Surface Area  
 29.95 kg/m 2 2.3 m 2 Preferred Pharmacy Pharmacy Name Phone [address] [place] [address], [facility] [phone] Your Updated Medication List  
  
   
 This list is accurate as of 9/25/18 11:29 AM.  Always use your most recent med list.  
  
  
  
  
 aspirin 81 mg chewable tablet Take 1 Tab by mouth daily. glimepiride 2 mg tablet Commonly known as:  AMARYL  
TAKE 1 TABLET BY MOUTH EVERY MORNING  
  
 glucose blood VI test strips strip Commonly known as:  ONETOUCH ULTRA TEST Check blood glucose once dialy  
  
 metFORMIN  mg tablet Commonly known as:  GLUCOPHAGE XR  
TAKE 4 TABLETS BY MOUTH DAILY WITH DINNER  
  
 pravastatin 20 mg tablet Commonly known as:  PRAVACHOL Take 1 Tab by mouth nightly. sildenafil citrate 100 mg tablet Commonly known as:  VIAGRA TAKE 1 TABLET BY MOUTH AS NEEDED -Do NOT take with nitrates or within 4 hours of an alpha BLOCKER  
  
 valACYclovir 500 mg tablet Commonly known as:  VALTREX Take 1 Tab by mouth daily. We Performed the Following CBC WITH AUTOMATED DIFF [35515 CPT(R)] HEMOGLOBIN A1C W/O EAG [22615 CPT(R)] LIPID PANEL [79824 CPT(R)] METABOLIC PANEL, COMPREHENSIVE [20433 CPT(R)] REFERRAL TO GASTROENTEROLOGY [BOE02 Custom] Referral Information Referral ID Referred By Referred To  
  
 [id number] [facility] [address] [place], [address] [facility] Visits Status Start Date End Date 1 New Request 9/25/18 9/25/19 If your referral has a status of pending review or denied, additional information will be sent to support the outcome of this decision. Introducing [facility] SERVICES! Dear Yesika Hagen: Thank you for requesting a Soundtracker account. Our records indicate that you already have an active Soundtracker account. You can access your account anytime at [email or web address] Did you know that you can access your hospital and ER discharge instructions at any time in Soundtracker? You can also review all of your test results from your hospital stay or ER visit. Additional Information If you have questions, please visit the Frequently Asked Questions section of the TherOxt website at [email or web address] Remember, Haute Secure is NOT to be used for urgent needs. For medical emergencies, dial 911. Now available from your iPhone and Android! Please provide this summary of care documentation to your next provider. Your primary care clinician is listed as Carrie Campos. If you have any questions after today's visit, please call [phone].

## 2018-09-25 NOTE — PROGRESS NOTES
Chief Complaint   Patient presents with    Diabetes    Erectile Dysfunction    Cholesterol Problem     Chief Complaint   Patient presents with    Diabetes    Erectile Dysfunction    Cholesterol Problem     Diabetic ROS - medication compliance: compliant all of the time, diabetic diet compliance: compliant all of the time, home glucose monitoring: is performed regularly. New concerns: skips breakfast often is adding glucerna in morning  Runs 1 mile every day  Diabetic exam: heart sounds normal rate, regular rhythm, normal S1, S2, no murmurs, rubs, clicks or gallops. Lab review:   Results for orders placed or performed in visit on 06/26/18   AMB POC HEMOGLOBIN A1C   Result Value Ref Range    Hemoglobin A1c (POC) 7.6 %     . Assessment: Diabetes Mellitus: not always as good diet and exercise reviewed   Plan: See orders for this visit as documented in the electronic medical record. Diabetic issues reviewed with him: diabetic diet discussed in detail, written exchange diet given, low cholesterol diet, weight control and daily exercise discussed and home glucose monitoring emphasized. Lab Results   Component Value Date/Time    Hemoglobin A1c 6.7 (H) 10/02/2017 11:18 AM    Hemoglobin A1c (POC) 7.6 06/26/2018 11:42 AM           Diagnoses and all orders for this visit:    1. Type 2 diabetes mellitus without complication, without long-term current use of insulin (HCC)  -     CBC WITH AUTOMATED DIFF  -     LIPID PANEL  -     METABOLIC PANEL, COMPREHENSIVE  -     HEMOGLOBIN A1C W/O EAG    2. Colon cancer screening  -     [facility]    tolerating statin      1. Controlled type 2 diabetes mellitus without complication, without long-term current use of insulin (HCC)  fair  - AMB POC HEMOGLOBIN A1C    2. Hypercholesterolemia    - pravastatin (PRAVACHOL) 20 mg tablet; Take 1 Tab by mouth nightly. Dispense: 90 Tab;  Refill: 3  Vitals:    09/25/18 1054 09/25/18 1122   BP: 139/88 132/82   Pulse: 77    Resp: 18 Temp: 97.9 °F (36.6 °C)    TempSrc: Oral    SpO2: 98%    Weight: 227 lb (103 kg)    Height: 6' 1\" (1.854 m)      Borderline BP

## 2018-09-25 NOTE — PROGRESS NOTES
1. Have you been to the ER, urgent care clinic since your last visit? Hospitalized since your last visit? No    2. Have you seen or consulted any other health care providers outside of the [address] [place] [address] since your last visit? Include any pap smears or colon screening.  No

## 2018-09-26 LAB
ALBUMIN SERPL-MCNC: 4.7 G/DL (ref 3.5–5.5)
ALBUMIN/GLOB SERPL: 1.9 {RATIO} (ref 1.2–2.2)
ALP SERPL-CCNC: 51 IU/L (ref 39–117)
ALT SERPL-CCNC: 26 IU/L (ref 0–44)
AST SERPL-CCNC: 24 IU/L (ref 0–40)
BASOPHILS # BLD AUTO: 0 X10E3/UL (ref 0–0.2)
BASOPHILS NFR BLD AUTO: 0 %
BILIRUB SERPL-MCNC: 1 MG/DL (ref 0–1.2)
BUN SERPL-MCNC: 12 MG/DL (ref 6–24)
BUN/CREAT SERPL: 12 (ref 9–20)
CALCIUM SERPL-MCNC: 9.7 MG/DL (ref 8.7–10.2)
CHLORIDE SERPL-SCNC: 98 MMOL/L (ref 96–106)
CHOLEST SERPL-MCNC: 154 MG/DL (ref 100–199)
CO2 SERPL-SCNC: 25 MMOL/L (ref 20–29)
CREAT SERPL-MCNC: 1.02 MG/DL (ref 0.76–1.27)
EOSINOPHIL # BLD AUTO: 0.2 X10E3/UL (ref 0–0.4)
EOSINOPHIL NFR BLD AUTO: 2 %
ERYTHROCYTE [DISTWIDTH] IN BLOOD BY AUTOMATED COUNT: 14 % (ref 12.3–15.4)
GLOBULIN SER CALC-MCNC: 2.5 G/DL (ref 1.5–4.5)
GLUCOSE SERPL-MCNC: 187 MG/DL (ref 65–99)
HBA1C MFR BLD: 7.5 % (ref 4.8–5.6)
HCT VFR BLD AUTO: 41.2 % (ref 37.5–51)
HDLC SERPL-MCNC: 44 MG/DL
HGB BLD-MCNC: 13.7 G/DL (ref 13–17.7)
IMM GRANULOCYTES # BLD: 0 X10E3/UL (ref 0–0.1)
IMM GRANULOCYTES NFR BLD: 0 %
INTERPRETATION, 910389: NORMAL
LDLC SERPL CALC-MCNC: 86 MG/DL (ref 0–99)
LYMPHOCYTES # BLD AUTO: 2.6 X10E3/UL (ref 0.7–3.1)
LYMPHOCYTES NFR BLD AUTO: 30 %
Lab: NORMAL
MCH RBC QN AUTO: 28.2 PG (ref 26.6–33)
MCHC RBC AUTO-ENTMCNC: 33.3 G/DL (ref 31.5–35.7)
MCV RBC AUTO: 85 FL (ref 79–97)
MONOCYTES # BLD AUTO: 0.7 X10E3/UL (ref 0.1–0.9)
MONOCYTES NFR BLD AUTO: 7 %
NEUTROPHILS # BLD AUTO: 5.4 X10E3/UL (ref 1.4–7)
NEUTROPHILS NFR BLD AUTO: 61 %
PLATELET # BLD AUTO: 335 X10E3/UL (ref 150–379)
POTASSIUM SERPL-SCNC: 4.6 MMOL/L (ref 3.5–5.2)
PROT SERPL-MCNC: 7.2 G/DL (ref 6–8.5)
RBC # BLD AUTO: 4.85 X10E6/UL (ref 4.14–5.8)
SODIUM SERPL-SCNC: 138 MMOL/L (ref 134–144)
TRIGL SERPL-MCNC: 120 MG/DL (ref 0–149)
VLDLC SERPL CALC-MCNC: 24 MG/DL (ref 5–40)
WBC # BLD AUTO: 8.9 X10E3/UL (ref 3.4–10.8)

## 2018-09-30 RX ORDER — GLIMEPIRIDE 4 MG/1
4 TABLET ORAL
Qty: 90 TAB | Refills: 1 | Status: SHIPPED | OUTPATIENT
Start: 2018-09-30 | End: 2019-04-09 | Stop reason: SDUPTHER

## 2019-04-09 RX ORDER — SILDENAFIL 100 MG/1
TABLET, FILM COATED ORAL
Qty: 24 TAB | Refills: 3 | Status: SHIPPED | OUTPATIENT
Start: 2019-04-09 | End: 2020-04-06

## 2019-04-09 RX ORDER — GLIMEPIRIDE 4 MG/1
TABLET ORAL
Qty: 90 TAB | Refills: 3 | Status: SHIPPED | OUTPATIENT
Start: 2019-04-09 | End: 2019-04-09 | Stop reason: SDUPTHER

## 2019-06-06 DIAGNOSIS — E78.00 HYPERCHOLESTEROLEMIA: ICD-10-CM

## 2019-06-06 RX ORDER — PRAVASTATIN SODIUM 20 MG/1
20 TABLET ORAL
Qty: 30 TAB | Refills: 0 | Status: SHIPPED | OUTPATIENT
Start: 2019-06-06 | End: 2019-11-18 | Stop reason: SDUPTHER

## 2019-06-06 RX ORDER — METFORMIN HYDROCHLORIDE 500 MG/1
TABLET, EXTENDED RELEASE ORAL
Qty: 120 TAB | Refills: 5 | OUTPATIENT
Start: 2019-06-06

## 2019-06-06 RX ORDER — PRAVASTATIN SODIUM 20 MG/1
20 TABLET ORAL
Qty: 90 TAB | Refills: 3 | Status: SHIPPED | COMMUNITY
Start: 2019-06-06 | End: 2019-06-20 | Stop reason: SDUPTHER

## 2019-06-06 RX ORDER — METFORMIN HYDROCHLORIDE 500 MG/1
2000 TABLET, EXTENDED RELEASE ORAL
Qty: 120 TAB | Refills: 0 | Status: SHIPPED | OUTPATIENT
Start: 2019-06-06 | End: 2019-06-20 | Stop reason: SDUPTHER

## 2019-06-06 RX ORDER — METFORMIN HYDROCHLORIDE 500 MG/1
TABLET, EXTENDED RELEASE ORAL
Qty: 120 TAB | Refills: 5 | Status: CANCELLED | OUTPATIENT
Start: 2019-06-06

## 2019-06-06 NOTE — TELEPHONE ENCOUNTER
Pt will run out of Metformin can a short supply be sent to his local [facility] on file until his mail order comes in, please send to mail order and local pharmacy

## 2019-07-23 ENCOUNTER — OFFICE VISIT (OUTPATIENT)
Dept: INTERNAL MEDICINE CLINIC | Age: 49
End: 2019-07-23

## 2019-07-23 VITALS
HEIGHT: 73 IN | SYSTOLIC BLOOD PRESSURE: 118 MMHG | BODY MASS INDEX: 29.16 KG/M2 | DIASTOLIC BLOOD PRESSURE: 80 MMHG | OXYGEN SATURATION: 98 % | TEMPERATURE: 96.2 F | WEIGHT: 220 LBS | RESPIRATION RATE: 16 BRPM | HEART RATE: 75 BPM

## 2019-07-23 DIAGNOSIS — Z80.42 FAMILY HISTORY OF PROSTATE CANCER: ICD-10-CM

## 2019-07-23 DIAGNOSIS — E11.9 TYPE 2 DIABETES MELLITUS WITHOUT COMPLICATION, WITHOUT LONG-TERM CURRENT USE OF INSULIN (HCC): ICD-10-CM

## 2019-07-23 DIAGNOSIS — Z12.5 ENCOUNTER FOR PROSTATE CANCER SCREENING: ICD-10-CM

## 2019-07-23 DIAGNOSIS — E78.00 HYPERCHOLESTEROLEMIA: Primary | ICD-10-CM

## 2019-07-23 NOTE — PROGRESS NOTES
1. Have you been to the ER, urgent care clinic since your last visit? Hospitalized since your last visit? No    2. Have you seen or consulted any other health care providers outside of the [address] [place] [address] since your last visit? Include any pap smears or colon screening.  No

## 2019-07-23 NOTE — PROGRESS NOTES
Julio Mayo is here for complete health maintenance physical exam and screening. he does not have other concerns. Health maintenance hx includes:  Exercise: very active. Form of exercise: runs daily   Diet: generally follows a low fat low cholesterol diet, follows a diabetic diet regularly, exercises regularly, nonsmoker  police  Cancer screening:    Colon cancer screening:  Last Colonoscopy: 2018 and was normal   Prostate cancer screening: PSA and/or LIZET:   Lab Results   Component Value Date/Time    Prostate Specific Ag 1.4 03/27/2018 02:56 PM    Prostate Specific Ag 1.1 12/02/2015 11:25 AM    Prostate Specific Ag 1.0 08/21/2014 11:12 AM          Lab Results   Component Value Date/Time    Cholesterol, total 154 09/25/2018 11:42 AM    HDL Cholesterol 44 09/25/2018 11:42 AM    LDL, calculated 86 09/25/2018 11:42 AM    VLDL, calculated 24 09/25/2018 11:42 AM    Triglyceride 120 09/25/2018 11:42 AM       Lab Results   Component Value Date/Time    Glucose 187 (H) 09/25/2018 11:42 AM         Immunizations:     Immunization History   Administered Date(s) Administered    TDAP Vaccine 08/05/2010      Immunization status: up to date and documented, refuses flu pneumovax. Social History     Socioeconomic History    Marital status:      Spouse name: Not on file    Number of children: Not on file    Years of education: Not on file    Highest education level: Not on file   Occupational History    Not on file   Social Needs    Financial resource strain: Not on file    Food insecurity:     Worry: Not on file     Inability: Not on file    Transportation needs:     Medical: Not on file     Non-medical: Not on file   Tobacco Use    Smoking status: Never Smoker    Smokeless tobacco: Never Used   Substance and Sexual Activity    Alcohol use:  Yes     Alcohol/week: 0.0 standard drinks     Comment: rarely    Drug use: Not on file    Sexual activity: Yes     Partners: Female   Lifestyle    Physical activity:     Days per week: Not on file     Minutes per session: Not on file    Stress: Not on file   Relationships    Social connections:     Talks on phone: Not on file     Gets together: Not on file     Attends [employer] service: Not on file     Active member of club or organization: Not on file     Attends meetings of clubs or organizations: Not on file     Relationship status: Not on file    Intimate partner violence:     Fear of current or ex partner: Not on file     Emotionally abused: Not on file     Physically abused: Not on file     Forced sexual activity: Not on file   Other Topics Concern    Not on file   Social History Narrative    Not on file     Past Surgical History:   Procedure Laterality Date    HX TONSILLECTOMY       Family History   Problem Relation Age of Onset    Hypertension Mother     Cancer Father         prostate    Hypertension Father     Cancer Sister         breast     Current Outpatient Medications on File Prior to Visit   Medication Sig Dispense Refill    glimepiride (AMARYL) 4 mg tablet TAKE 1 TABLET BY MOUTH EVERY MORNING 90 Tab 0    metFORMIN ER (GLUCOPHAGE XR) 500 mg tablet Take 4 Tabs by mouth daily (with dinner). 360 Tab 0    pravastatin (PRAVACHOL) 20 mg tablet Take 1 Tab by mouth nightly. 90 Tab 0    pravastatin (PRAVACHOL) 20 mg tablet Take 1 Tab by mouth nightly. 30 Tab 0    sildenafil citrate (VIAGRA) 100 mg tablet TAKE 1 TABLET BY MOUTH AS NEEDED -Do NOT take with nitrates or within 4 hours of an alpha BLOCKER 24 Tab 3    metFORMIN ER (GLUCOPHAGE XR) 500 mg tablet TAKE 4 TABLETS BY MOUTH DAILY WITH DINNER( BRAND NECESSARY) 120 Tab 5    metFORMIN ER (GLUCOPHAGE XR) 500 mg tablet TAKE 4 TABLETS BY MOUTH DAILY WITH DINNER 360 Tab 1    aspirin 81 mg chewable tablet Take 1 Tab by mouth daily. 100 Tab 6    valACYclovir (VALTREX) 500 mg tablet Take 1 Tab by mouth daily.  90 Tab 1    glucose blood VI test strips (ONE TOUCH ULTRA TEST) strip Check blood glucose once dialy 30 strip 11     No current facility-administered medications on file prior to visit. .  Vitals:    07/23/19 1326 07/23/19 1359   BP: 130/89 118/80   Pulse: 75    Resp: 16    Temp: 96.2 °F (35.7 °C)    TempSrc: Oral    SpO2: 98%    Weight: 220 lb (99.8 kg)    Height: 6' 1\" (1.854 m)      The physical exam is generally normal. He appears well, alert and oriented x 3, pleasant and cooperative. Vitals as noted. ENT normal, neck supple and free of adenopathy, or masses. No thyromegaly or carotid bruits. Cranial nerves and fundi normal. Lungs are clear to auscultation. Heart sounds are normal, no murmurs, clicks, gallops or rubs. Abdomen is soft, no tenderness, masses or organomegaly. Extremities, peripheral pulses and reflexes are normal.  . Rectal: deferred, not clinically indicated. . Skin is normal without rashes or suspicious lesions. 1. Family history of prostate cancer  Reviewed dad was in 70`s    2. Hypercholesterolemia  follow  - LIPID PANEL  - METABOLIC PANEL, COMPREHENSIVE    3. Type 2 diabetes mellitus without complication, without long-term current use of insulin ([facility]  Does not monitor  Exercises and eats well  - HEMOGLOBIN A1C W/O EAG  - MICROALBUMIN, UR, RAND W/ MICROALB/CREAT RATIO    4.  Encounter for prostate cancer screening  No symptoms  - CBC WITH AUTOMATED DIFF  - LIPID PANEL  - METABOLIC PANEL, COMPREHENSIVE  - PSA, DIAGNOSTIC (PROSTATE SPECIFIC AG)

## 2019-07-24 LAB
ALBUMIN SERPL-MCNC: 4.8 G/DL (ref 3.5–5.5)
ALBUMIN/CREAT UR: 8.2 MG/G CREAT (ref 0–30)
ALBUMIN/GLOB SERPL: 1.9 {RATIO} (ref 1.2–2.2)
ALP SERPL-CCNC: 56 IU/L (ref 39–117)
ALT SERPL-CCNC: 25 IU/L (ref 0–44)
AST SERPL-CCNC: 25 IU/L (ref 0–40)
BASOPHILS # BLD AUTO: 0 X10E3/UL (ref 0–0.2)
BASOPHILS NFR BLD AUTO: 0 %
BILIRUB SERPL-MCNC: 0.8 MG/DL (ref 0–1.2)
BUN SERPL-MCNC: 11 MG/DL (ref 6–24)
BUN/CREAT SERPL: 12 (ref 9–20)
CALCIUM SERPL-MCNC: 9.7 MG/DL (ref 8.7–10.2)
CHLORIDE SERPL-SCNC: 103 MMOL/L (ref 96–106)
CHOLEST SERPL-MCNC: 170 MG/DL (ref 100–199)
CO2 SERPL-SCNC: 21 MMOL/L (ref 20–29)
CREAT SERPL-MCNC: 0.89 MG/DL (ref 0.76–1.27)
CREAT UR-MCNC: 184.6 MG/DL
EOSINOPHIL # BLD AUTO: 0.2 X10E3/UL (ref 0–0.4)
EOSINOPHIL NFR BLD AUTO: 2 %
ERYTHROCYTE [DISTWIDTH] IN BLOOD BY AUTOMATED COUNT: 14.2 % (ref 12.3–15.4)
GLOBULIN SER CALC-MCNC: 2.5 G/DL (ref 1.5–4.5)
GLUCOSE SERPL-MCNC: 149 MG/DL (ref 65–99)
HBA1C MFR BLD: 7.5 % (ref 4.8–5.6)
HCT VFR BLD AUTO: 43.7 % (ref 37.5–51)
HDLC SERPL-MCNC: 40 MG/DL
HGB BLD-MCNC: 14.2 G/DL (ref 13–17.7)
IMM GRANULOCYTES # BLD AUTO: 0 X10E3/UL (ref 0–0.1)
IMM GRANULOCYTES NFR BLD AUTO: 0 %
INTERPRETATION, 910389: NORMAL
LDLC SERPL CALC-MCNC: 101 MG/DL (ref 0–99)
LYMPHOCYTES # BLD AUTO: 2.8 X10E3/UL (ref 0.7–3.1)
LYMPHOCYTES NFR BLD AUTO: 29 %
Lab: NORMAL
MCH RBC QN AUTO: 27.6 PG (ref 26.6–33)
MCHC RBC AUTO-ENTMCNC: 32.5 G/DL (ref 31.5–35.7)
MCV RBC AUTO: 85 FL (ref 79–97)
MICROALBUMIN UR-MCNC: 15.1 UG/ML
MONOCYTES # BLD AUTO: 0.7 X10E3/UL (ref 0.1–0.9)
MONOCYTES NFR BLD AUTO: 7 %
NEUTROPHILS # BLD AUTO: 6.3 X10E3/UL (ref 1.4–7)
NEUTROPHILS NFR BLD AUTO: 62 %
PLATELET # BLD AUTO: 330 X10E3/UL (ref 150–450)
POTASSIUM SERPL-SCNC: 4.9 MMOL/L (ref 3.5–5.2)
PROT SERPL-MCNC: 7.3 G/DL (ref 6–8.5)
PSA SERPL-MCNC: 1.1 NG/ML (ref 0–4)
RBC # BLD AUTO: 5.14 X10E6/UL (ref 4.14–5.8)
SODIUM SERPL-SCNC: 140 MMOL/L (ref 134–144)
TRIGL SERPL-MCNC: 144 MG/DL (ref 0–149)
VLDLC SERPL CALC-MCNC: 29 MG/DL (ref 5–40)
WBC # BLD AUTO: 10 X10E3/UL (ref 3.4–10.8)

## 2019-10-07 RX ORDER — GUAIFENESIN 100 MG/5ML
LIQUID (ML) ORAL
Qty: 108 TAB | Refills: 3 | Status: SHIPPED | OUTPATIENT
Start: 2019-10-07 | End: 2021-01-07 | Stop reason: SDUPTHER

## 2019-10-10 RX ORDER — GLIMEPIRIDE 4 MG/1
TABLET ORAL
Qty: 90 TAB | Refills: 0 | Status: SHIPPED | OUTPATIENT
Start: 2019-10-10 | End: 2020-01-22 | Stop reason: SDUPTHER

## 2019-11-18 ENCOUNTER — OFFICE VISIT (OUTPATIENT)
Dept: INTERNAL MEDICINE CLINIC | Age: 49
End: 2019-11-18

## 2019-11-18 VITALS
BODY MASS INDEX: 29.55 KG/M2 | DIASTOLIC BLOOD PRESSURE: 80 MMHG | RESPIRATION RATE: 18 BRPM | SYSTOLIC BLOOD PRESSURE: 128 MMHG | HEART RATE: 88 BPM | TEMPERATURE: 97.4 F | HEIGHT: 73 IN | OXYGEN SATURATION: 97 % | WEIGHT: 223 LBS

## 2019-11-18 DIAGNOSIS — E11.9 TYPE 2 DIABETES MELLITUS WITHOUT COMPLICATION, WITHOUT LONG-TERM CURRENT USE OF INSULIN (HCC): ICD-10-CM

## 2019-11-18 DIAGNOSIS — E11.9 TYPE 2 DIABETES MELLITUS WITHOUT COMPLICATION, WITHOUT LONG-TERM CURRENT USE OF INSULIN (HCC): Primary | ICD-10-CM

## 2019-11-18 LAB — HBA1C MFR BLD HPLC: 8.4 %

## 2019-11-18 NOTE — PROGRESS NOTES
SUBJECTIVE: Zain Trinidad is a 44 y.o. male seen for a follow up visit; he has diabetes, hypertension and hyperlipidemia. Current Outpatient Medications   Medication Sig Dispense Refill    glimepiride (AMARYL) 4 mg tablet TAKE 1 TABLET BY MOUTH EVERY MORNING 90 Tab 0    aspirin 81 mg chewable tablet CHEW AND SWALLOW 1 TABLET DAILY 108 Tab 3    pravastatin (PRAVACHOL) 20 mg tablet Take 1 Tab by mouth nightly. 90 Tab 0    sildenafil citrate (VIAGRA) 100 mg tablet TAKE 1 TABLET BY MOUTH AS NEEDED -Do NOT take with nitrates or within 4 hours of an alpha BLOCKER 24 Tab 3    metFORMIN ER (GLUCOPHAGE XR) 500 mg tablet TAKE 4 TABLETS BY MOUTH DAILY WITH DINNER( BRAND NECESSARY) 120 Tab 5    valACYclovir (VALTREX) 500 mg tablet Take 1 Tab by mouth daily. 90 Tab 1    glucose blood VI test strips (ONE TOUCH ULTRA TEST) strip Check blood glucose once dialy 30 strip 11     Patient Active Problem List   Diagnosis Code    Genital HSV A60.00    Hypercholesterolemia E78.00    ED (erectile dysfunction) N52.9    Acute superficial venous thrombosis of lower extremity I82.819    Myalgia M79.10    Type 2 diabetes mellitus without complication (HCC) V65.2    Statin intolerance Z78.9    Family history of prostate cancer Z80.42     System Review: Cardiovascular ROS - taking medications as instructed, no medication side effects noted, no TIA's, no chest pain on exertion, no dyspnea on exertion, no swelling of ankles, Diabetic ROS - medication compliance: compliant all of the time, diabetic diet compliance: compliant all of the time, home glucose monitoring: is not performed. New concerns: control may not be as good    Not monitoring  No to vaccines.      OBJECTIVE:  Visit Vitals  /80   Pulse 88   Temp 97.4 °F (36.3 °C) (Oral)   Resp 18   Ht 6' 1\" (1.854 m)   Wt 223 lb (101.2 kg)   SpO2 97%   BMI 29.42 kg/m²      Appearance: alert, well appearing, and in no distress, oriented to person, place, and time and normal appearing weight. General exam: CVS exam BP noted to be well controlled today in office, S1, S2 normal, no gallop, no murmur, chest clear, no JVD, no HSM, no edema, diabetic exam feet: warm, good capillary refill and blanching with elevation noted. Lab review: labs reviewed, I note that glycosylated hemoglobin abnormal 8.4 A1c.     ASSESSMENT:  diabetes poorly controlled, hypertension stable, hyperlipidemia stable. PLAN:  the following changes are made - suggest jardiance as alternate will look at [employer] getting inviolved in disease  Getting brochiures    Suggest home testing now    Labs prio to next visit. Diagnoses and all orders for this visit:    1. Type 2 diabetes mellitus without complication, without long-term current use of insulin (HCC)  -     AMB POC HEMOGLOBIN A1C  -     CBC WITH AUTOMATED DIFF; Future  -     LIPID PANEL; Future  -     METABOLIC PANEL, COMPREHENSIVE; Future  -     HEMOGLOBIN A1C WITH EAG; Future    Other orders  -     empagliflozin (JARDIANCE) 10 mg tablet; Take 1 Tab by mouth daily. Indications: type 2 diabetes mellitus    Subjective:  He has had diabetes for about nine years, he still does not monitor his blood sugars and I scold him about that and he says he eats a pretty reasonable diet, but not always the best.  He exercises a moderate amount and he continues with that. He thinks he is doing okay, but thinks his diet is not quite as good. Physical Examination:  Weight has been stable. Blood pressure has been stable. His lungs are clear, S1, S2 regular. He says his eyes are fine, he sees the eye doctor. His feet are fine, he denies tingling, burning or numbness. His A1c was elevated. Plan:  I suggested adding Jardiance. He wants to look at data on that, I told him to go online and read about Joe Harp, told him good data, I told him there are coupons available. I would recommend 10 mg and within one to two months titrate to 25 mg.   At that point we can discontinue Glimepiride, which is probably not working. He understands. I told him he needs to return in about three months, maybe four. Lab work prior to the visit. Intensify diet and exercise. Home glucose testing monitoring is advised.

## 2019-11-18 NOTE — PROGRESS NOTES
1. Have you been to the ER, urgent care clinic since your last visit? Hospitalized since your last visit?yes. [place]-torn hamstring 9 about 2 mos ago)    2. Have you seen or consulted any other health care providers outside of the [address] [place] [address] since your last visit? Include any pap smears or colon screening.  No

## 2020-01-22 RX ORDER — GLIMEPIRIDE 4 MG/1
TABLET ORAL
Qty: 90 TAB | Refills: 1 | Status: SHIPPED | OUTPATIENT
Start: 2020-01-22 | End: 2020-07-11

## 2020-03-16 ENCOUNTER — OFFICE VISIT (OUTPATIENT)
Dept: INTERNAL MEDICINE CLINIC | Age: 50
End: 2020-03-16

## 2020-03-16 VITALS
RESPIRATION RATE: 18 BRPM | OXYGEN SATURATION: 98 % | TEMPERATURE: 96 F | HEART RATE: 70 BPM | SYSTOLIC BLOOD PRESSURE: 126 MMHG | WEIGHT: 225 LBS | BODY MASS INDEX: 29.82 KG/M2 | HEIGHT: 73 IN | DIASTOLIC BLOOD PRESSURE: 75 MMHG

## 2020-03-16 DIAGNOSIS — E11.9 TYPE 2 DIABETES MELLITUS WITHOUT COMPLICATION, WITHOUT LONG-TERM CURRENT USE OF INSULIN (HCC): ICD-10-CM

## 2020-03-16 DIAGNOSIS — Z00.00 GENERAL MEDICAL EXAMINATION: Primary | ICD-10-CM

## 2020-03-16 NOTE — PROGRESS NOTES
1. Have you been to the ER, urgent care clinic since your last visit? Hospitalized since your last visit? No    2. Have you seen or consulted any other health care providers outside of the [address] [place] [address] since your last visit? Include any pap smears or colon screening.  No

## 2020-03-16 NOTE — PROGRESS NOTES
Chief Complaint   Patient presents with    Diabetes    Cholesterol Problem    Erectile Dysfunction     Izzy Adams. is here for complete health maintenance physical exam and screening. he does have other concerns. He never started the jardiance and gas not checked his sugars  Health maintenance hx includes:  Exercise: very active. 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:    Colon cancer screening:  Last Colonoscopy: 2018 and was normal   Prostate cancer screening: PSA and/or LIZET:   Lab Results   Component Value Date/Time    Prostate Specific Ag 1.1 07/23/2019 02:13 PM    Prostate Specific Ag 1.4 03/27/2018 02:56 PM    Prostate Specific Ag 1.1 12/02/2015 11:25 AM          Lab Results   Component Value Date/Time    Cholesterol, total 170 07/23/2019 02:13 PM    HDL Cholesterol 40 07/23/2019 02:13 PM    LDL, calculated 101 (H) 07/23/2019 02:13 PM    VLDL, calculated 29 07/23/2019 02:13 PM    Triglyceride 144 07/23/2019 02:13 PM       Lab Results   Component Value Date/Time    Glucose 149 (H) 07/23/2019 02:13 PM         Immunizations:     Immunization History   Administered Date(s) Administered    TDAP Vaccine 08/05/2010      Immunization status: not done has been resistant  . Social History     Socioeconomic History    Marital status:      Spouse name: Not on file    Number of children: Not on file    Years of education: Not on file    Highest education level: Not on file   Occupational History    Not on file   Social Needs    Financial resource strain: Not on file    Food insecurity     Worry: Not on file     Inability: Not on file    Transportation needs     Medical: Not on file     Non-medical: Not on file   Tobacco Use    Smoking status: Never Smoker    Smokeless tobacco: Never Used   Substance and Sexual Activity    Alcohol use:  Yes     Alcohol/week: 0.0 standard drinks Comment: rarely    Drug use: Not on file    Sexual activity: Yes     Partners: Female   Lifestyle    Physical activity     Days per week: Not on file     Minutes per session: Not on file    Stress: Not on file   Relationships    Social connections     Talks on phone: Not on file     Gets together: Not on file     Attends [employer] service: Not on file     Active member of club or organization: Not on file     Attends meetings of clubs or organizations: Not on file     Relationship status: Not on file    Intimate partner violence     Fear of current or ex partner: Not on file     Emotionally abused: Not on file     Physically abused: Not on file     Forced sexual activity: Not on file   Other Topics Concern    Not on file   Social History Narrative    Not on file     Past Surgical History:   Procedure Laterality Date    HX TONSILLECTOMY       Family History   Problem Relation Age of Onset    Hypertension Mother     Cancer Father         prostate    Hypertension Father     Cancer Sister         breast     Current Outpatient Medications on File Prior to Visit   Medication Sig Dispense Refill    glimepiride (AMARYL) 4 mg tablet TAKE 1 TABLET BY MOUTH EVERY MORNING 90 Tab 1    aspirin 81 mg chewable tablet CHEW AND SWALLOW 1 TABLET DAILY 108 Tab 3    pravastatin (PRAVACHOL) 20 mg tablet Take 1 Tab by mouth nightly. 90 Tab 0    sildenafil citrate (VIAGRA) 100 mg tablet TAKE 1 TABLET BY MOUTH AS NEEDED -Do NOT take with nitrates or within 4 hours of an alpha BLOCKER 24 Tab 3    metFORMIN ER (GLUCOPHAGE XR) 500 mg tablet TAKE 4 TABLETS BY MOUTH DAILY WITH DINNER( BRAND NECESSARY) 120 Tab 5    glucose blood VI test strips (ONE TOUCH ULTRA TEST) strip Check blood glucose once dialy 30 strip 11    valACYclovir (VALTREX) 500 mg tablet Take 1 Tab by mouth daily. 90 Tab 1     No current facility-administered medications on file prior to visit.       .  Vitals:    03/16/20 1035 03/16/20 1114   BP: 137/81 126/75 Pulse: 70    Resp: 18    Temp: 96 °F (35.6 °C)    TempSrc: Oral    SpO2: 98%    Weight: 225 lb (102.1 kg)    Height: 6' 1\" (1.854 m)    The physical exam is generally normal. He appears well, alert and oriented x 3, pleasant and cooperative. Vitals as noted. ENT normal, neck supple and free of adenopathy, or masses. No thyromegaly or carotid bruits. Cranial nerves and fundi normal. Lungs are clear to auscultation. Heart sounds are normal, no murmurs, clicks, gallops or rubs. Abdomen is soft, no tenderness, masses or organomegaly. Extremities, peripheral pulses and reflexes are normal.  . Rectal: deferred, not clinically indicated. . Skin is normal without rashes or suspicious lesions. Labs were preordered he is doing today      Diagnoses and all orders for this visit:    1. General medical examination    2.  Type 2 diabetes mellitus without complication, without long-term current use of insulin ([facility] [address]  -     REFERRAL TO NUTRITION      Needs home testing

## 2020-03-17 ENCOUNTER — PATIENT MESSAGE (OUTPATIENT)
Dept: INTERNAL MEDICINE CLINIC | Age: 50
End: 2020-03-17

## 2020-03-17 LAB
ALBUMIN SERPL-MCNC: 4.4 G/DL (ref 3.8–4.9)
ALBUMIN/GLOB SERPL: 1.8 {RATIO} (ref 1.2–2.2)
ALP SERPL-CCNC: 63 IU/L (ref 39–117)
ALT SERPL-CCNC: 25 IU/L (ref 0–44)
AST SERPL-CCNC: 26 IU/L (ref 0–40)
BASOPHILS # BLD AUTO: 0.1 X10E3/UL (ref 0–0.2)
BASOPHILS NFR BLD AUTO: 1 %
BILIRUB SERPL-MCNC: 0.9 MG/DL (ref 0–1.2)
BUN SERPL-MCNC: 11 MG/DL (ref 6–24)
BUN/CREAT SERPL: 11 (ref 9–20)
CALCIUM SERPL-MCNC: 9.4 MG/DL (ref 8.7–10.2)
CHLORIDE SERPL-SCNC: 98 MMOL/L (ref 96–106)
CHOLEST SERPL-MCNC: 166 MG/DL (ref 100–199)
CO2 SERPL-SCNC: 24 MMOL/L (ref 20–29)
CREAT SERPL-MCNC: 1.01 MG/DL (ref 0.76–1.27)
EOSINOPHIL # BLD AUTO: 0.2 X10E3/UL (ref 0–0.4)
EOSINOPHIL NFR BLD AUTO: 3 %
ERYTHROCYTE [DISTWIDTH] IN BLOOD BY AUTOMATED COUNT: 13.3 % (ref 11.6–15.4)
EST. AVERAGE GLUCOSE BLD GHB EST-MCNC: 229 MG/DL
GLOBULIN SER CALC-MCNC: 2.5 G/DL (ref 1.5–4.5)
GLUCOSE SERPL-MCNC: 240 MG/DL (ref 65–99)
HBA1C MFR BLD: 9.6 % (ref 4.8–5.6)
HCT VFR BLD AUTO: 41.8 % (ref 37.5–51)
HDLC SERPL-MCNC: 40 MG/DL
HGB BLD-MCNC: 14 G/DL (ref 13–17.7)
IMM GRANULOCYTES # BLD AUTO: 0 X10E3/UL (ref 0–0.1)
IMM GRANULOCYTES NFR BLD AUTO: 0 %
INTERPRETATION, 910389: NORMAL
LDLC SERPL CALC-MCNC: 100 MG/DL (ref 0–99)
LYMPHOCYTES # BLD AUTO: 2.8 X10E3/UL (ref 0.7–3.1)
LYMPHOCYTES NFR BLD AUTO: 34 %
Lab: NORMAL
MCH RBC QN AUTO: 28 PG (ref 26.6–33)
MCHC RBC AUTO-ENTMCNC: 33.5 G/DL (ref 31.5–35.7)
MCV RBC AUTO: 84 FL (ref 79–97)
MONOCYTES # BLD AUTO: 0.7 X10E3/UL (ref 0.1–0.9)
MONOCYTES NFR BLD AUTO: 8 %
NEUTROPHILS # BLD AUTO: 4.5 X10E3/UL (ref 1.4–7)
NEUTROPHILS NFR BLD AUTO: 54 %
PLATELET # BLD AUTO: 342 X10E3/UL (ref 150–450)
POTASSIUM SERPL-SCNC: 4.4 MMOL/L (ref 3.5–5.2)
PROT SERPL-MCNC: 6.9 G/DL (ref 6–8.5)
RBC # BLD AUTO: 5 X10E6/UL (ref 4.14–5.8)
SODIUM SERPL-SCNC: 137 MMOL/L (ref 134–144)
TRIGL SERPL-MCNC: 132 MG/DL (ref 0–149)
VLDLC SERPL CALC-MCNC: 26 MG/DL (ref 5–40)
WBC # BLD AUTO: 8.2 X10E3/UL (ref 3.4–10.8)

## 2020-04-03 NOTE — TELEPHONE ENCOUNTER
From: Seun Acuña MD  To: Brett George.   Sent: 3/17/2020 8:28 PM EDT  Subject: poor control    Poor control of diabetes    I want you to start jardiance one daily    Please confirm and I will order    Alternate is too add one dose of insulin daily

## 2020-04-04 NOTE — PROGRESS NOTES
Patient was contacted through my chart by Dr. Darvin Estrada.  He was started on a new diabetes medication

## 2020-04-06 RX ORDER — SILDENAFIL 100 MG/1
TABLET, FILM COATED ORAL
Qty: 24 TAB | Refills: 3 | Status: SHIPPED | OUTPATIENT
Start: 2020-04-06 | End: 2021-03-10 | Stop reason: SDUPTHER

## 2020-04-06 RX ORDER — METFORMIN HYDROCHLORIDE 500 MG/1
TABLET, EXTENDED RELEASE ORAL
Qty: 360 TAB | Refills: 3 | Status: SHIPPED | OUTPATIENT
Start: 2020-04-06 | End: 2021-05-03

## 2020-04-07 ENCOUNTER — TELEPHONE (OUTPATIENT)
Dept: INTERNAL MEDICINE CLINIC | Age: 50
End: 2020-04-07

## 2020-04-07 NOTE — TELEPHONE ENCOUNTER
Dequan Anders [employer]   Phone Number: [phone]             Caller's first and last name: Pt   Reason for call: Would like to know if he should take the Glimepiride along with the metformin and Jardiance medication   Callback required yes/no and why: y   Best contact number(s): [phone]   Details to clarify the request: n/a

## 2020-05-01 ENCOUNTER — TELEPHONE (OUTPATIENT)
Dept: INTERNAL MEDICINE CLINIC | Age: 50
End: 2020-05-01

## 2020-06-16 ENCOUNTER — VIRTUAL VISIT (OUTPATIENT)
Dept: INTERNAL MEDICINE CLINIC | Age: 50
End: 2020-06-16

## 2020-06-16 DIAGNOSIS — E11.9 TYPE 2 DIABETES MELLITUS WITHOUT COMPLICATION, WITHOUT LONG-TERM CURRENT USE OF INSULIN (HCC): Primary | ICD-10-CM

## 2020-06-16 DIAGNOSIS — E11.9 TYPE 2 DIABETES MELLITUS WITHOUT COMPLICATION, WITHOUT LONG-TERM CURRENT USE OF INSULIN (HCC): ICD-10-CM

## 2020-06-16 NOTE — PROGRESS NOTES
Phone call only    Diabetic ROS - medication compliance: compliant all of the time, diabetic diet compliance: compliant most of the time, home glucose monitoring: is performed sporadically, fasting values range 130, nonfasting values range 179, acute symptoms are on jardiance 10 mg for 3 months wants follow up  Some polyuria and rare constipation  aftwer exercise some glucose elevation seen  Not dizzy. New concerns: hoping better results. BP Readings from Last 3 Encounters:   03/16/20 126/75   11/18/19 128/80   07/23/19 118/80     Wt Readings from Last 3 Encounters:   03/16/20 225 lb (102.1 kg)   11/18/19 223 lb (101.2 kg)   07/23/19 220 lb (99.8 kg)       Diabetic exam: no exam.   Lab Results   Component Value Date/Time    Hemoglobin A1c 9.6 (H) 03/16/2020 11:35 AM    Hemoglobin A1c (POC) 8.4 11/18/2019 02:35 PM       Assessment: Diabetes Mellitus: borderline controlled, needs further observation. Plan: See orders for this visit as documented in the electronic medical record. Diabetic issues reviewed with him: diabetic diet discussed in detail, written exchange diet given, low cholesterol diet, weight control and daily exercise discussed and home glucose monitoring emphasized. Total time 23 minutes with chart review      Diagnoses and all orders for this visit:    1. Type 2 diabetes mellitus without complication, without long-term current use of insulin (HCC)  -     HEMOGLOBIN A1C WITH EAG;  Future      Will contact after labs back possible titration of dose

## 2020-06-18 LAB
EST. AVERAGE GLUCOSE BLD GHB EST-MCNC: 166 MG/DL
HBA1C MFR BLD: 7.4 % (ref 4.8–5.6)

## 2020-07-10 DIAGNOSIS — E78.00 HYPERCHOLESTEROLEMIA: ICD-10-CM

## 2020-07-11 RX ORDER — PRAVASTATIN SODIUM 20 MG/1
TABLET ORAL
Qty: 90 TAB | Refills: 3 | Status: SHIPPED | OUTPATIENT
Start: 2020-07-11 | End: 2021-07-14

## 2020-07-11 RX ORDER — GLIMEPIRIDE 4 MG/1
TABLET ORAL
Qty: 90 TAB | Refills: 3 | Status: SHIPPED | OUTPATIENT
Start: 2020-07-11 | End: 2021-07-14

## 2020-08-05 ENCOUNTER — TELEPHONE (OUTPATIENT)
Dept: INTERNAL MEDICINE CLINIC | Age: 50
End: 2020-08-05

## 2020-08-05 DIAGNOSIS — Z80.42 FAMILY HISTORY OF PROSTATE CANCER: ICD-10-CM

## 2020-08-05 DIAGNOSIS — E78.00 HYPERCHOLESTEROLEMIA: ICD-10-CM

## 2020-08-05 DIAGNOSIS — E11.9 TYPE 2 DIABETES MELLITUS WITHOUT COMPLICATION, WITHOUT LONG-TERM CURRENT USE OF INSULIN (HCC): Primary | ICD-10-CM

## 2020-08-05 DIAGNOSIS — Z00.00 GENERAL MEDICAL EXAMINATION: ICD-10-CM

## 2020-08-11 DIAGNOSIS — Z00.00 GENERAL MEDICAL EXAMINATION: ICD-10-CM

## 2020-08-11 DIAGNOSIS — Z80.42 FAMILY HISTORY OF PROSTATE CANCER: ICD-10-CM

## 2020-08-11 DIAGNOSIS — E11.9 TYPE 2 DIABETES MELLITUS WITHOUT COMPLICATION, WITHOUT LONG-TERM CURRENT USE OF INSULIN (HCC): ICD-10-CM

## 2020-09-09 LAB
ALBUMIN SERPL-MCNC: 4.7 G/DL (ref 3.8–4.9)
ALBUMIN/CREAT UR: 9 MG/G CREAT (ref 0–29)
ALBUMIN/GLOB SERPL: 1.8 {RATIO} (ref 1.2–2.2)
ALP SERPL-CCNC: 56 IU/L (ref 39–117)
ALT SERPL-CCNC: 11 IU/L (ref 0–44)
AST SERPL-CCNC: 15 IU/L (ref 0–40)
BASOPHILS # BLD AUTO: 0.1 X10E3/UL (ref 0–0.2)
BASOPHILS NFR BLD AUTO: 1 %
BILIRUB SERPL-MCNC: 1 MG/DL (ref 0–1.2)
BUN SERPL-MCNC: 8 MG/DL (ref 6–24)
BUN/CREAT SERPL: 9 (ref 9–20)
CALCIUM SERPL-MCNC: 10.1 MG/DL (ref 8.7–10.2)
CHLORIDE SERPL-SCNC: 96 MMOL/L (ref 96–106)
CHOLEST SERPL-MCNC: 152 MG/DL (ref 100–199)
CO2 SERPL-SCNC: 26 MMOL/L (ref 20–29)
CREAT SERPL-MCNC: 0.9 MG/DL (ref 0.76–1.27)
CREAT UR-MCNC: 68.9 MG/DL
EOSINOPHIL # BLD AUTO: 0.2 X10E3/UL (ref 0–0.4)
EOSINOPHIL NFR BLD AUTO: 3 %
ERYTHROCYTE [DISTWIDTH] IN BLOOD BY AUTOMATED COUNT: 14.2 % (ref 11.6–15.4)
EST. AVERAGE GLUCOSE BLD GHB EST-MCNC: 151 MG/DL
GLOBULIN SER CALC-MCNC: 2.6 G/DL (ref 1.5–4.5)
GLUCOSE SERPL-MCNC: 125 MG/DL (ref 65–99)
HBA1C MFR BLD: 6.9 % (ref 4.8–5.6)
HCT VFR BLD AUTO: 45.4 % (ref 37.5–51)
HDLC SERPL-MCNC: 54 MG/DL
HGB BLD-MCNC: 14.9 G/DL (ref 13–17.7)
IMM GRANULOCYTES # BLD AUTO: 0 X10E3/UL (ref 0–0.1)
IMM GRANULOCYTES NFR BLD AUTO: 0 %
INTERPRETATION, 910389: NORMAL
LDLC SERPL CALC-MCNC: 85 MG/DL (ref 0–99)
LYMPHOCYTES # BLD AUTO: 2.9 X10E3/UL (ref 0.7–3.1)
LYMPHOCYTES NFR BLD AUTO: 34 %
Lab: NORMAL
MCH RBC QN AUTO: 28 PG (ref 26.6–33)
MCHC RBC AUTO-ENTMCNC: 32.8 G/DL (ref 31.5–35.7)
MCV RBC AUTO: 85 FL (ref 79–97)
MICROALBUMIN UR-MCNC: 6.3 UG/ML
MONOCYTES # BLD AUTO: 0.6 X10E3/UL (ref 0.1–0.9)
MONOCYTES NFR BLD AUTO: 7 %
NEUTROPHILS # BLD AUTO: 4.7 X10E3/UL (ref 1.4–7)
NEUTROPHILS NFR BLD AUTO: 55 %
PLATELET # BLD AUTO: 367 X10E3/UL (ref 150–450)
POTASSIUM SERPL-SCNC: 4.6 MMOL/L (ref 3.5–5.2)
PROT SERPL-MCNC: 7.3 G/DL (ref 6–8.5)
PSA SERPL-MCNC: 1.5 NG/ML (ref 0–4)
RBC # BLD AUTO: 5.33 X10E6/UL (ref 4.14–5.8)
SODIUM SERPL-SCNC: 138 MMOL/L (ref 134–144)
TRIGL SERPL-MCNC: 62 MG/DL (ref 0–149)
VLDLC SERPL CALC-MCNC: 13 MG/DL (ref 5–40)
WBC # BLD AUTO: 8.4 X10E3/UL (ref 3.4–10.8)

## 2020-09-10 ENCOUNTER — OFFICE VISIT (OUTPATIENT)
Dept: INTERNAL MEDICINE CLINIC | Age: 50
End: 2020-09-10
Payer: COMMERCIAL

## 2020-09-10 VITALS
DIASTOLIC BLOOD PRESSURE: 82 MMHG | HEIGHT: 73 IN | RESPIRATION RATE: 18 BRPM | HEART RATE: 84 BPM | TEMPERATURE: 96.5 F | WEIGHT: 200 LBS | BODY MASS INDEX: 26.51 KG/M2 | SYSTOLIC BLOOD PRESSURE: 130 MMHG | OXYGEN SATURATION: 97 %

## 2020-09-10 DIAGNOSIS — Z00.00 GENERAL MEDICAL EXAMINATION: Primary | ICD-10-CM

## 2020-09-10 DIAGNOSIS — E11.9 TYPE 2 DIABETES MELLITUS WITHOUT COMPLICATION, WITHOUT LONG-TERM CURRENT USE OF INSULIN (HCC): ICD-10-CM

## 2020-09-10 DIAGNOSIS — E78.00 HYPERCHOLESTEROLEMIA: ICD-10-CM

## 2020-09-10 PROCEDURE — 99396 PREV VISIT EST AGE 40-64: CPT | Performed by: INTERNAL MEDICINE

## 2020-09-10 RX ORDER — ADJUVANT AS01B/PF, VIAL 1 OF 2
1 VIAL (ML) INTRAMUSCULAR ONCE
Qty: 1 EACH | Refills: 1 | Status: SHIPPED | OUTPATIENT
Start: 2020-09-10 | End: 2020-09-10

## 2020-09-10 NOTE — PROGRESS NOTES
Chief Complaint   Patient presents with    Diabetes     follow up       1. Have you been to the ER, urgent care clinic since your last visit? Hospitalized since your last visit? No    2. Have you seen or consulted any other health care providers outside of the [address] [place] [address] since your last visit? Include any pap smears or colon screening.  No

## 2020-09-10 NOTE — PROGRESS NOTES
Pedro Cabrales. is here for complete health maintenance physical exam and screening. he does have other concerns. Health maintenance hx includes:  Exercise: very active. 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 screening:    Colon cancer screening:  Last Colonoscopy: 2019 and was normal   Prostate cancer screening: PSA and/or LIZET:   Lab Results   Component Value Date/Time    Prostate Specific Ag 1.5 09/08/2020 01:18 PM    Prostate Specific Ag 1.1 07/23/2019 02:13 PM    Prostate Specific Ag 1.4 03/27/2018 02:56 PM          Lab Results   Component Value Date/Time    Cholesterol, total 152 09/08/2020 01:18 PM    HDL Cholesterol 54 09/08/2020 01:18 PM    LDL, calculated 100 (H) 03/16/2020 11:35 AM    LDL Chol Calc (NIH) 85 09/08/2020 01:18 PM    VLDL, calculated 26 03/16/2020 11:35 AM    VLDL Cholesterol Shawn 13 09/08/2020 01:18 PM    Triglyceride 62 09/08/2020 01:18 PM       Lab Results   Component Value Date/Time    Glucose 125 (H) 09/08/2020 01:18 PM         Immunizations:     Immunization History   Administered Date(s) Administered    TDAP Vaccine 08/05/2010      Immunization status: refuses now have discussed his options. Social History     Socioeconomic History    Marital status:      Spouse name: Not on file    Number of children: Not on file    Years of education: Not on file    Highest education level: Not on file   Occupational History    Not on file   Social Needs    Financial resource strain: Not on file    Food insecurity     Worry: Not on file     Inability: Not on file    Transportation needs     Medical: Not on file     Non-medical: Not on file   Tobacco Use    Smoking status: Never Smoker    Smokeless tobacco: Never Used   Substance and Sexual Activity    Alcohol use:  Yes     Alcohol/week: 0.0 standard drinks     Comment: rarely    Drug use: Not on file    Sexual activity: Yes     Partners: Female Lifestyle    Physical activity     Days per week: Not on file     Minutes per session: Not on file    Stress: Not on file   Relationships    Social connections     Talks on phone: Not on file     Gets together: Not on file     Attends [employer] service: Not on file     Active member of club or organization: Not on file     Attends meetings of clubs or organizations: Not on file     Relationship status: Not on file    Intimate partner violence     Fear of current or ex partner: Not on file     Emotionally abused: Not on file     Physically abused: Not on file     Forced sexual activity: Not on file   Other Topics Concern    Not on file   Social History Narrative    Not on file     Past Surgical History:   Procedure Laterality Date    HX TONSILLECTOMY       Family History   Problem Relation Age of Onset    Hypertension Mother     Cancer Father         prostate    Hypertension Father     Cancer Sister         breast     Current Outpatient Medications on File Prior to Visit   Medication Sig Dispense Refill    glimepiride (AMARYL) 4 mg tablet TAKE 1 TABLET BY MOUTH EVERY MORNING (NEED TO SEE DR BEFORE MORE REFILLS) 90 Tab 3    pravastatin (PRAVACHOL) 20 mg tablet TAKE 1 TABLET BY MOUTH NIGHTLY 90 Tab 3    empagliflozin (JARDIANCE) 25 mg tablet Take 1 Tab by mouth daily. 90 Tab 1    glucose blood VI test strips (OneTouch Ultra Test) strip Check blood glucose once daily e11.9 30 Strip 11    sildenafil citrate (VIAGRA) 100 mg tablet TAKE 1 TABLET BY MOUTH AS NEEDED -Do NOT take with nitrates or within 4 hours of an alpha BLOCKER 24 Tab 3    metFORMIN ER (GLUCOPHAGE XR) 500 mg tablet TAKE 4 TABLETS BY MOUTH DAILY WITH DINNER 360 Tab 3    aspirin 81 mg chewable tablet CHEW AND SWALLOW 1 TABLET DAILY 108 Tab 3    valACYclovir (VALTREX) 500 mg tablet Take 1 Tab by mouth daily. 90 Tab 1     No current facility-administered medications on file prior to visit.       .  Vitals:    09/10/20 1041 09/10/20 1100 BP: 137/77 130/82   Pulse: 84    Resp: 18    Temp: (!) 96.5 °F (35.8 °C)    TempSrc: Temporal    SpO2: 97%    Weight: 200 lb (90.7 kg)    Height: 6' 1\" (1.854 m)      S1 and S2 normal, no murmurs, clicks, gallops or rubs. Regular rate and rhythm. Chest is clear; no wheezes or rales. No edema or JVD. The abdomen is soft without tenderness, guarding, mass, rebound or organomegaly. Bowel sounds are normal. No CVA tenderness or inguinal adenopathy noted. No prostate exam  Neuro normal  Lab Results   Component Value Date/Time    Cholesterol, total 152 09/08/2020 01:18 PM    HDL Cholesterol 54 09/08/2020 01:18 PM    LDL, calculated 100 (H) 03/16/2020 11:35 AM    LDL Chol Calc (NIH) 85 09/08/2020 01:18 PM    VLDL, calculated 26 03/16/2020 11:35 AM    VLDL Cholesterol Shawn 13 09/08/2020 01:18 PM    Triglyceride 62 09/08/2020 01:18 PM     Lab Results   Component Value Date/Time    Hemoglobin A1c 6.9 (H) 09/08/2020 01:18 PM    Hemoglobin A1c (POC) 8.4 11/18/2019 02:35 PM     Lab Results   Component Value Date/Time    GFR est  09/08/2020 01:18 PM    GFR est non-AA 98 09/08/2020 01:18 PM    Creatinine 0.90 09/08/2020 01:18 PM    BUN 8 09/08/2020 01:18 PM    Sodium 138 09/08/2020 01:18 PM    Potassium 4.6 09/08/2020 01:18 PM    Chloride 96 09/08/2020 01:18 PM    CO2 26 09/08/2020 01:18 PM     Diagnoses and all orders for this visit:    1. General medical examination    2. Type 2 diabetes mellitus without complication, without long-term current use of insulin ([address]    3. Hypercholesterolemia    Other orders  -     varicella-zoster (Shingrix Adjuvant Component-PF) injection; 1 Each by IntraMUSCular route once for 1 dose.       He is doing well  Will consider vaccines

## 2020-10-09 DIAGNOSIS — A60.00 GENITAL HSV: ICD-10-CM

## 2020-10-10 RX ORDER — VALACYCLOVIR HYDROCHLORIDE 500 MG/1
500 TABLET, FILM COATED ORAL DAILY
Qty: 90 TAB | Refills: 1 | Status: SHIPPED | OUTPATIENT
Start: 2020-10-10

## 2021-01-07 RX ORDER — GUAIFENESIN 100 MG/5ML
LIQUID (ML) ORAL
Qty: 108 TAB | Refills: 3 | Status: SHIPPED | OUTPATIENT
Start: 2021-01-07 | End: 2022-01-05

## 2021-03-08 ENCOUNTER — TELEPHONE (OUTPATIENT)
Dept: INTERNAL MEDICINE CLINIC | Age: 51
End: 2021-03-08

## 2021-03-08 DIAGNOSIS — E11.9 TYPE 2 DIABETES MELLITUS WITHOUT COMPLICATION, WITHOUT LONG-TERM CURRENT USE OF INSULIN (HCC): Primary | ICD-10-CM

## 2021-03-08 NOTE — TELEPHONE ENCOUNTER
[facility]            Reason for call: He would like to gets lab drawn prior to his appt 03/11/21, he would like a lab order faxed to [facility] near [place]. Callback required yes/no and why: Yes when completed.        Best contact number(s):[phone]     KARLA

## 2021-03-10 RX ORDER — SILDENAFIL 100 MG/1
TABLET, FILM COATED ORAL
Qty: 24 TAB | Refills: 3 | Status: SHIPPED | OUTPATIENT
Start: 2021-03-10 | End: 2021-10-14

## 2021-03-11 ENCOUNTER — OFFICE VISIT (OUTPATIENT)
Dept: INTERNAL MEDICINE CLINIC | Age: 51
End: 2021-03-11
Payer: COMMERCIAL

## 2021-03-11 VITALS
BODY MASS INDEX: 27.43 KG/M2 | TEMPERATURE: 98.1 F | SYSTOLIC BLOOD PRESSURE: 126 MMHG | HEIGHT: 73 IN | WEIGHT: 207 LBS | OXYGEN SATURATION: 96 % | HEART RATE: 84 BPM | RESPIRATION RATE: 18 BRPM | DIASTOLIC BLOOD PRESSURE: 80 MMHG

## 2021-03-11 DIAGNOSIS — K59.03 DRUG-INDUCED CONSTIPATION: ICD-10-CM

## 2021-03-11 DIAGNOSIS — E11.9 TYPE 2 DIABETES MELLITUS WITHOUT COMPLICATION, WITHOUT LONG-TERM CURRENT USE OF INSULIN (HCC): Primary | ICD-10-CM

## 2021-03-11 PROCEDURE — 99213 OFFICE O/P EST LOW 20 MIN: CPT | Performed by: INTERNAL MEDICINE

## 2021-03-11 NOTE — PROGRESS NOTES
Diabetic ROS - medication compliance: compliant all of the time, diabetic diet compliance: compliant most of the time, home glucose monitoring: is performed sporadically, fasting values range 130, nonfasting values range 179, acute symptoms are on jardiance 25 mg for 12 months wants follow up  Some polyuria and occ constipation  aftwer exercise some glucose elevation seen  Not dizzy. New concerns: hoping better results. Tried metamucil   bloated  Labs pending from early today    BP Readings from Last 3 Encounters:   03/11/21 126/80   09/10/20 130/82   03/16/20 126/75     Wt Readings from Last 3 Encounters:   03/11/21 207 lb (93.9 kg)   09/10/20 200 lb (90.7 kg)   03/16/20 225 lb (102.1 kg)       Diabetic exam: no exam.   Lab Results   Component Value Date/Time    Hemoglobin A1c 6.9 (H) 09/08/2020 01:18 PM    Hemoglobin A1c (POC) 8.4 11/18/2019 02:35 PM     Vitals:    03/11/21 1100 03/11/21 1133   BP: 136/85 126/80   Pulse: 84    Resp: 18    Temp: 98.1 °F (36.7 °C)    TempSrc: Temporal    SpO2: 96%    Weight: 207 lb (93.9 kg)    Height: 6' 1\" (1.854 m)      S1 and S2 normal, no murmurs, clicks, gallops or rubs. Regular rate and rhythm. Chest is clear; no wheezes or rales. No edema or JVD. Assessment: Diabetes Mellitus: better controlled, needs further observation. Plan: See orders for this visit as documented in the electronic medical record. Diabetic issues reviewed with him: diabetic diet discussed in detail, written exchange diet given, low cholesterol diet, weight control and daily exercise discussed and home glucose monitoring emphasized. Add exercise now as weather warmwer    covid vaccinations reviewed and priority reviewed and my advice to get vaccinated reviewed  1. Type 2 diabetes mellitus without complication, without long-term current use of insulin (HCC)  Stable  Will start running again    2.  Drug-induced constipation  From jardiance    benefiber suggested

## 2021-03-11 NOTE — PROGRESS NOTES
Chief Complaint   Patient presents with    Diabetes     follow up     Cholesterol Problem    Erectile Dysfunction         1. Have you been to the ER, urgent care clinic since your last visit? Hospitalized since your last visit? No    2. Have you seen or consulted any other health care providers outside of the [address] [place] [address] since your last visit? Include any pap smears or colon screening.  No

## 2021-03-12 LAB
ALBUMIN SERPL-MCNC: 4.6 G/DL (ref 3.8–4.9)
ALBUMIN/GLOB SERPL: 2 {RATIO} (ref 1.2–2.2)
ALP SERPL-CCNC: 60 IU/L (ref 39–117)
ALT SERPL-CCNC: 15 IU/L (ref 0–44)
AST SERPL-CCNC: 19 IU/L (ref 0–40)
BILIRUB SERPL-MCNC: 1 MG/DL (ref 0–1.2)
BUN SERPL-MCNC: 14 MG/DL (ref 6–24)
BUN/CREAT SERPL: 13 (ref 9–20)
CALCIUM SERPL-MCNC: 9.5 MG/DL (ref 8.7–10.2)
CHLORIDE SERPL-SCNC: 101 MMOL/L (ref 96–106)
CHOLEST SERPL-MCNC: 170 MG/DL (ref 100–199)
CO2 SERPL-SCNC: 24 MMOL/L (ref 20–29)
CREAT SERPL-MCNC: 1.04 MG/DL (ref 0.76–1.27)
EST. AVERAGE GLUCOSE BLD GHB EST-MCNC: 160 MG/DL
GLOBULIN SER CALC-MCNC: 2.3 G/DL (ref 1.5–4.5)
GLUCOSE SERPL-MCNC: 154 MG/DL (ref 65–99)
HBA1C MFR BLD: 7.2 % (ref 4.8–5.6)
HDLC SERPL-MCNC: 48 MG/DL
IMP & REVIEW OF LAB RESULTS: NORMAL
LDLC SERPL CALC-MCNC: 104 MG/DL (ref 0–99)
Lab: NORMAL
POTASSIUM SERPL-SCNC: 4.2 MMOL/L (ref 3.5–5.2)
PROT SERPL-MCNC: 6.9 G/DL (ref 6–8.5)
SODIUM SERPL-SCNC: 139 MMOL/L (ref 134–144)
TRIGL SERPL-MCNC: 96 MG/DL (ref 0–149)
VLDLC SERPL CALC-MCNC: 18 MG/DL (ref 5–40)

## 2021-05-03 RX ORDER — METFORMIN HYDROCHLORIDE 500 MG/1
TABLET, EXTENDED RELEASE ORAL
Qty: 360 TAB | Refills: 3 | Status: SHIPPED | OUTPATIENT
Start: 2021-05-03 | End: 2022-04-05

## 2021-07-14 DIAGNOSIS — E78.00 HYPERCHOLESTEROLEMIA: ICD-10-CM

## 2021-07-14 RX ORDER — GLIMEPIRIDE 4 MG/1
TABLET ORAL
Qty: 90 TABLET | Refills: 3 | Status: SHIPPED | OUTPATIENT
Start: 2021-07-14 | End: 2022-07-05

## 2021-07-14 RX ORDER — PRAVASTATIN SODIUM 20 MG/1
TABLET ORAL
Qty: 90 TABLET | Refills: 3 | Status: SHIPPED | OUTPATIENT
Start: 2021-07-14 | End: 2021-09-13 | Stop reason: DRUGHIGH

## 2021-08-14 RX ORDER — EMPAGLIFLOZIN 25 MG/1
TABLET, FILM COATED ORAL
Qty: 90 TABLET | Refills: 3 | Status: SHIPPED | OUTPATIENT
Start: 2021-08-14 | End: 2022-08-09

## 2021-09-08 ENCOUNTER — TELEPHONE (OUTPATIENT)
Dept: INTERNAL MEDICINE CLINIC | Age: 51
End: 2021-09-08

## 2021-09-08 DIAGNOSIS — E78.00 HYPERCHOLESTEROLEMIA: ICD-10-CM

## 2021-09-08 DIAGNOSIS — E11.9 TYPE 2 DIABETES MELLITUS WITHOUT COMPLICATION, WITHOUT LONG-TERM CURRENT USE OF INSULIN (HCC): Primary | ICD-10-CM

## 2021-09-08 DIAGNOSIS — Z12.5 PROSTATE CANCER SCREENING: ICD-10-CM

## 2021-09-11 LAB
ALBUMIN SERPL-MCNC: 4.7 G/DL (ref 3.8–4.9)
ALBUMIN/GLOB SERPL: 1.7 {RATIO} (ref 1.2–2.2)
ALP SERPL-CCNC: 63 IU/L (ref 48–121)
ALT SERPL-CCNC: 22 IU/L (ref 0–44)
AST SERPL-CCNC: 21 IU/L (ref 0–40)
BASOPHILS # BLD AUTO: 0 X10E3/UL (ref 0–0.2)
BASOPHILS NFR BLD AUTO: 1 %
BILIRUB SERPL-MCNC: 1.1 MG/DL (ref 0–1.2)
BUN SERPL-MCNC: 11 MG/DL (ref 6–24)
BUN/CREAT SERPL: 10 (ref 9–20)
CALCIUM SERPL-MCNC: 9.7 MG/DL (ref 8.7–10.2)
CHLORIDE SERPL-SCNC: 98 MMOL/L (ref 96–106)
CHOLEST SERPL-MCNC: 196 MG/DL (ref 100–199)
CO2 SERPL-SCNC: 23 MMOL/L (ref 20–29)
CREAT SERPL-MCNC: 1.14 MG/DL (ref 0.76–1.27)
EOSINOPHIL # BLD AUTO: 0.2 X10E3/UL (ref 0–0.4)
EOSINOPHIL NFR BLD AUTO: 2 %
ERYTHROCYTE [DISTWIDTH] IN BLOOD BY AUTOMATED COUNT: 13.4 % (ref 11.6–15.4)
EST. AVERAGE GLUCOSE BLD GHB EST-MCNC: 180 MG/DL
GLOBULIN SER CALC-MCNC: 2.7 G/DL (ref 1.5–4.5)
GLUCOSE SERPL-MCNC: 182 MG/DL (ref 65–99)
HBA1C MFR BLD: 7.9 % (ref 4.8–5.6)
HCT VFR BLD AUTO: 48.2 % (ref 37.5–51)
HDLC SERPL-MCNC: 46 MG/DL
HGB BLD-MCNC: 16.1 G/DL (ref 13–17.7)
IMM GRANULOCYTES # BLD AUTO: 0 X10E3/UL (ref 0–0.1)
IMM GRANULOCYTES NFR BLD AUTO: 0 %
IMP & REVIEW OF LAB RESULTS: NORMAL
LDLC SERPL CALC-MCNC: 125 MG/DL (ref 0–99)
LYMPHOCYTES # BLD AUTO: 3.1 X10E3/UL (ref 0.7–3.1)
LYMPHOCYTES NFR BLD AUTO: 35 %
MCH RBC QN AUTO: 28.4 PG (ref 26.6–33)
MCHC RBC AUTO-ENTMCNC: 33.4 G/DL (ref 31.5–35.7)
MCV RBC AUTO: 85 FL (ref 79–97)
MONOCYTES # BLD AUTO: 0.6 X10E3/UL (ref 0.1–0.9)
MONOCYTES NFR BLD AUTO: 7 %
NEUTROPHILS # BLD AUTO: 4.8 X10E3/UL (ref 1.4–7)
NEUTROPHILS NFR BLD AUTO: 55 %
PLATELET # BLD AUTO: 358 X10E3/UL (ref 150–450)
POTASSIUM SERPL-SCNC: 4.7 MMOL/L (ref 3.5–5.2)
PROT SERPL-MCNC: 7.4 G/DL (ref 6–8.5)
PSA SERPL-MCNC: 1 NG/ML (ref 0–4)
RBC # BLD AUTO: 5.66 X10E6/UL (ref 4.14–5.8)
SODIUM SERPL-SCNC: 136 MMOL/L (ref 134–144)
TRIGL SERPL-MCNC: 137 MG/DL (ref 0–149)
VLDLC SERPL CALC-MCNC: 25 MG/DL (ref 5–40)
WBC # BLD AUTO: 8.7 X10E3/UL (ref 3.4–10.8)

## 2021-09-13 ENCOUNTER — OFFICE VISIT (OUTPATIENT)
Dept: INTERNAL MEDICINE CLINIC | Age: 51
End: 2021-09-13
Payer: COMMERCIAL

## 2021-09-13 VITALS
BODY MASS INDEX: 29.29 KG/M2 | RESPIRATION RATE: 16 BRPM | OXYGEN SATURATION: 98 % | DIASTOLIC BLOOD PRESSURE: 80 MMHG | WEIGHT: 221 LBS | HEIGHT: 73 IN | SYSTOLIC BLOOD PRESSURE: 124 MMHG | HEART RATE: 70 BPM | TEMPERATURE: 98.1 F

## 2021-09-13 DIAGNOSIS — Z80.42 FAMILY HISTORY OF PROSTATE CANCER: ICD-10-CM

## 2021-09-13 DIAGNOSIS — E78.00 HYPERCHOLESTEROLEMIA: ICD-10-CM

## 2021-09-13 DIAGNOSIS — Z12.5 PROSTATE CANCER SCREENING: Primary | ICD-10-CM

## 2021-09-13 DIAGNOSIS — E11.9 TYPE 2 DIABETES MELLITUS WITHOUT COMPLICATION, WITHOUT LONG-TERM CURRENT USE OF INSULIN (HCC): ICD-10-CM

## 2021-09-13 PROCEDURE — 99396 PREV VISIT EST AGE 40-64: CPT | Performed by: INTERNAL MEDICINE

## 2021-09-13 RX ORDER — PRAVASTATIN SODIUM 40 MG/1
40 TABLET ORAL
Qty: 90 TABLET | Refills: 3 | Status: SHIPPED | OUTPATIENT
Start: 2021-09-13 | End: 2022-08-22

## 2021-09-13 NOTE — PROGRESS NOTES
Issa Downs. is here for complete health maintenance physical exam and screening. he does have other concerns. Health maintenance hx includes:  Exercise: very active. 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 screening:    Colon cancer screening:  Last Colonoscopy: 2019 and was normal   Prostate cancer screening: PSA and/or LIZET:   Lab Results   Component Value Date/Time    Prostate Specific Ag 1.0 09/10/2021 10:14 AM    Prostate Specific Ag 1.5 09/08/2020 01:18 PM    Prostate Specific Ag 1.1 07/23/2019 02:13 PM          Lab Results   Component Value Date/Time    Cholesterol, total 196 09/10/2021 10:14 AM    HDL Cholesterol 46 09/10/2021 10:14 AM    LDL, calculated 125 (H) 09/10/2021 10:14 AM    LDL, calculated 100 (H) 03/16/2020 11:35 AM    VLDL, calculated 25 09/10/2021 10:14 AM    VLDL, calculated 26 03/16/2020 11:35 AM    Triglyceride 137 09/10/2021 10:14 AM       Lab Results   Component Value Date/Time    Glucose 182 (H) 09/10/2021 10:14 AM         Immunizations:     Immunization History   Administered Date(s) Administered    TDAP Vaccine 08/05/2010      Immunization status: refuses now have discussed his options. Social History     Socioeconomic History    Marital status:      Spouse name: Not on file    Number of children: Not on file    Years of education: Not on file    Highest education level: Not on file   Occupational History    Not on file   Tobacco Use    Smoking status: Never Smoker    Smokeless tobacco: Never Used   Substance and Sexual Activity    Alcohol use:  Yes     Alcohol/week: 0.0 standard drinks     Comment: rarely    Drug use: Not on file    Sexual activity: Yes     Partners: Female   Other Topics Concern    Not on file   Social History Narrative    Not on file     Social Determinants of Health     Financial Resource Strain:     Difficulty of Paying Living Expenses:    Food Insecurity:     Worried About [facility] in the Last Year:    [address] in the Last Year:    Transportation Needs:     Lack of Transportation (Medical):  Lack of Transportation (Non-Medical):    Physical Activity:     Days of Exercise per Week:     Minutes of Exercise per Session:    Stress:     Feeling of Stress :    Social Connections:     Frequency of Communication with Friends and Family:     Frequency of Social Gatherings with Friends and Family:     Attends [employer] Services:     Active Member of Clubs or Organizations:     Attends Club or Organization Meetings:     Marital Status:    Intimate Partner Violence:     Fear of Current or Ex-Partner:     Emotionally Abused:     Physically Abused:     Sexually Abused:      Past Surgical History:   Procedure Laterality Date    HX TONSILLECTOMY       Family History   Problem Relation Age of Onset    Hypertension Mother     Cancer Father         prostate    Hypertension Father     Cancer Sister         breast     Current Outpatient Medications on File Prior to Visit   Medication Sig Dispense Refill    Jardiance 25 mg tablet TAKE 1 TABLET DAILY 90 Tablet 3    glimepiride (AMARYL) 4 mg tablet TAKE 1 TABLET BY MOUTH EVERY MORNING (NEED TO SEE DR BEFORE MORE REFILLS) 90 Tablet 3    metFORMIN ER (GLUCOPHAGE XR) 500 mg tablet TAKE 4 TABLETS BY MOUTH DAILY WITH DINNER 360 Tab 3    sildenafil citrate (VIAGRA) 100 mg tablet As needed 24 Tab 3    aspirin 81 mg chewable tablet CHEW AND SWALLOW 1 TABLET DAILY 108 Tab 3    valACYclovir (VALTREX) 500 mg tablet Take 1 Tab by mouth daily. 90 Tab 1    glucose blood VI test strips (OneTouch Ultra Test) strip Check blood glucose once daily e11.9 30 Strip 11     No current facility-administered medications on file prior to visit.      .  Vitals:    09/13/21 1147 09/13/21 1157   BP: 130/85 124/80   Pulse: 70    Resp: 16    Temp: 98.1 °F (36.7 °C)    SpO2: 98%    Weight: 221 lb (100.2 kg)    Height: 6' 1\" (1.854 m)      S1 and S2 normal, no murmurs, clicks, gallops or rubs. Regular rate and rhythm. Chest is clear; no wheezes or rales. No edema or JVD. The abdomen is soft without tenderness, guarding, mass, rebound or organomegaly. Bowel sounds are normal. No CVA tenderness or inguinal adenopathy noted. No prostate exam  Neuro normal  Lab Results   Component Value Date/Time    Cholesterol, total 196 09/10/2021 10:14 AM    HDL Cholesterol 46 09/10/2021 10:14 AM    LDL, calculated 125 (H) 09/10/2021 10:14 AM    LDL, calculated 100 (H) 03/16/2020 11:35 AM    VLDL, calculated 25 09/10/2021 10:14 AM    VLDL, calculated 26 03/16/2020 11:35 AM    Triglyceride 137 09/10/2021 10:14 AM     Lab Results   Component Value Date/Time    Hemoglobin A1c 7.9 (H) 09/10/2021 10:14 AM    Hemoglobin A1c (POC) 8.4 11/18/2019 02:35 PM     Lab Results   Component Value Date/Time    GFR est AA 84 09/10/2021 10:14 AM    GFR est non-AA 73 09/10/2021 10:14 AM    Creatinine 1.14 09/10/2021 10:14 AM    BUN 11 09/10/2021 10:14 AM    Sodium 136 09/10/2021 10:14 AM    Potassium 4.7 09/10/2021 10:14 AM    Chloride 98 09/10/2021 10:14 AM    CO2 23 09/10/2021 10:14 AM     Diagnoses and all orders for this visit:    1. Hypercholesterolemia    Other orders  -     pravastatin (PRAVACHOL) 40 mg tablet; Take 1 Tablet by mouth nightly. He is doing wellcovid up to date      Diagnoses and all orders for this visit:    1. Prostate cancer screening    2. Hypercholesterolemia    3. Family history of prostate cancer    4. Type 2 diabetes mellitus without complication, without long-term current use of insulin (HCC)    Other orders  -     pravastatin (PRAVACHOL) 40 mg tablet; Take 1 Tablet by mouth nightly.       Increase statin dose

## 2021-09-13 NOTE — PROGRESS NOTES
1. Have you been to the ER, urgent care clinic since your last visit? Hospitalized since your last visit? No    2. Have you seen or consulted any other health care providers outside of the [address] [place] [address] since your last visit? Include any pap smears or colon screening.  No   Chief Complaint   Patient presents with    Diabetes     follow up    Cholesterol Problem     follow up

## 2021-10-14 RX ORDER — SILDENAFIL 100 MG/1
TABLET, FILM COATED ORAL
Qty: 24 TABLET | Refills: 5 | Status: SHIPPED | OUTPATIENT
Start: 2021-10-14 | End: 2022-10-10

## 2022-01-05 RX ORDER — GUAIFENESIN 100 MG/5ML
LIQUID (ML) ORAL
Qty: 108 TABLET | Refills: 3 | Status: SHIPPED | OUTPATIENT
Start: 2022-01-05

## 2022-03-19 PROBLEM — Z80.42 FAMILY HISTORY OF PROSTATE CANCER: Status: ACTIVE | Noted: 2019-07-23

## 2022-04-05 RX ORDER — METFORMIN HYDROCHLORIDE 500 MG/1
TABLET, EXTENDED RELEASE ORAL
Qty: 360 TABLET | Refills: 3 | Status: SHIPPED | OUTPATIENT
Start: 2022-04-05

## 2022-04-20 ENCOUNTER — OFFICE VISIT (OUTPATIENT)
Dept: INTERNAL MEDICINE CLINIC | Age: 52
End: 2022-04-20
Payer: COMMERCIAL

## 2022-04-20 VITALS
WEIGHT: 214.4 LBS | BODY MASS INDEX: 28.41 KG/M2 | RESPIRATION RATE: 14 BRPM | DIASTOLIC BLOOD PRESSURE: 88 MMHG | HEIGHT: 73 IN | TEMPERATURE: 98.2 F | OXYGEN SATURATION: 99 % | HEART RATE: 91 BPM | SYSTOLIC BLOOD PRESSURE: 130 MMHG

## 2022-04-20 DIAGNOSIS — S59.912A FOREARM INJURY, LEFT, INITIAL ENCOUNTER: ICD-10-CM

## 2022-04-20 DIAGNOSIS — E11.9 TYPE 2 DIABETES MELLITUS WITHOUT COMPLICATION, WITHOUT LONG-TERM CURRENT USE OF INSULIN (HCC): ICD-10-CM

## 2022-04-20 DIAGNOSIS — E78.00 HYPERCHOLESTEROLEMIA: Primary | ICD-10-CM

## 2022-04-20 PROCEDURE — 99214 OFFICE O/P EST MOD 30 MIN: CPT | Performed by: INTERNAL MEDICINE

## 2022-04-20 NOTE — PROGRESS NOTES
Reviewed record in preparation for visit and have obtained necessary documentation. Identified pt with two pt identifiers(name and ). Chief Complaint   Patient presents with    Diabetes     Follow up      Vitals:    22 1505   BP: 130/88   Pulse: 91   Resp: 14   Temp: 98.2 °F (36.8 °C)   TempSrc: Temporal   SpO2: 99%   Weight: 214 lb 6.4 oz (97.3 kg)   Height: 6' 1\" (1.854 m)        Health Maintenance Due   Topic Date Due    Hepatitis C Test  Never done    COVID-19 Vaccine (1) Never done    Pneumococcal Vaccine (1 - PCV) Never done    Shingles Vaccine (1 of 2) Never done    DTaP/Tdap/Td  (2 - Td or Tdap) 2020    Diabetic Foot Care  2021       Mr. Duran Doctor has a reminder for a \"due or due soon\" health maintenance. I have asked that he discuss this further with his primary care provider for follow-up on this health maintenance. Coordination of Care Questionnaire:  :     1) Have you been to an emergency room, urgent care clinic since your last visit? no   Hospitalized since your last visit? no             2) Have you seen or consulted any other health care providers outside of [address] [place] [address] since your last visit? no  (Include any pap smears or colon screenings in this section.)    3. For patients aged 39-70: Has the patient had a colonoscopy / FIT/ Cologuard? Yes - Care Gap present. Most recent result on file      If the patient is female:  4. For patients aged 41-77: Has the patient had a mammogram within the past 2 years? NA - based on age or sex       11. For patients aged 21-65: Has the patient had a pap smear? NA - based on age or sex      In the event something were to happen to you and you were unable to speak on your behalf, do you have an Advance Directive/ Living Will in place stating your wishes?  NO    Do you have an Advance Directive on file? no    6) Are you interested in receiving information on Advance Directives? no    Patient is accompanied by self I have received verbal consent from Sara Rene. to discuss any/all medical information while they are present in the room.

## 2022-04-20 NOTE — PROGRESS NOTES
SUBJECTIVE: Elijah Santos is a 46 y.o. male seen for a follow up visit; he has diabetes, hyperlipidemia and no known cardiovascular conditions. Current Outpatient Medications   Medication Sig Dispense Refill    metFORMIN ER (GLUCOPHAGE XR) 500 mg tablet TAKE 4 TABLETS BY MOUTH DAILY WITH DINNER 360 Tablet 3    aspirin 81 mg chewable tablet CHEW AND SWALLOW 1 TABLET DAILY 108 Tablet 3    sildenafil citrate (VIAGRA) 100 mg tablet TAKE AS NEEDED 24 Tablet 5    pravastatin (PRAVACHOL) 40 mg tablet Take 1 Tablet by mouth nightly. 90 Tablet 3    Jardiance 25 mg tablet TAKE 1 TABLET DAILY 90 Tablet 3    glimepiride (AMARYL) 4 mg tablet TAKE 1 TABLET BY MOUTH EVERY MORNING (NEED TO SEE DR BEFORE MORE REFILLS) 90 Tablet 3    valACYclovir (VALTREX) 500 mg tablet Take 1 Tab by mouth daily. 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    Hypercholesterolemia E78.00    ED (erectile dysfunction) N52.9    Acute superficial venous thrombosis of lower extremity I82.819    Myalgia M79.10    Type 2 diabetes mellitus without complication (HCC) S54.4    Statin intolerance Z78.9    Family history of prostate cancer Z80.42     System Review: Cardiovascular ROS - taking medications as instructed, no medication side effects noted, patient does not perform home BP monitoring, no TIA's, no chest pain on exertion, no dyspnea on exertion, no swelling of ankles, Diabetic ROS - medication compliance: compliant all of the time, diabetic diet compliance: noncompliant some of the time, home glucose monitoring: is not performed. New concerns: left forearm injury 3 weeks ago at work trying to handle a person when working as a  did not reopport as has been mild.      OBJECTIVE:  Visit Vitals  /88 (BP 1 Location: Left upper arm, BP Patient Position: Sitting, BP Cuff Size: Large adult)   Pulse 91   Temp 98.2 °F (36.8 °C) (Temporal)   Resp 14   Ht 6' 1\" (1.854 m)   Wt 214 lb 6.4 oz (97.3 kg)   SpO2 99%   BMI 28.29 kg/m²      BP Readings from Last 3 Encounters:   04/20/22 130/88   09/13/21 124/80   03/11/21 126/80       Appearance: alert, well appearing, and in no distress, oriented to person, place, and time and normal appearing weight. General exam: CVS exam BP noted to be well controlled today in office, S1, S2 normal, no gallop, no murmur, chest clear, no JVD, no HSM, no edema, left foream no bruise no swelling tender  Upper forearm pain with ext rotation. Lab review: labs reviewed, I note that glycosylated hemoglobin abnormal   Lab Results   Component Value Date/Time    Hemoglobin A1c 8.1 (H) 04/18/2022 01:57 PM    Hemoglobin A1c (POC) 8.4 11/18/2019 02:35 PM     , lipids LDL result does not yet meet goal.     ASSESSMENT:  diabetes borderline controlled, hyperlipidemia asymptomatic, reasonably well controlled, injury advise no lifting till better. PLAN:  current treatment plan is effective, no change in therapy  lab results and schedule of future lab studies reviewed with patient  repeat labs ordered prior to next appointment  orders and follow up as documented in patient record  reviewed diet, exercise and weight control  recommended sodium restriction  cardiovascular risk and specific lipid/LDL goals reviewed  offered trulicity as a possible. Diagnoses and all orders for this visit:    1. Hypercholesterolemia    2. Type 2 diabetes mellitus without complication, without long-term current use of insulin ([facility] [address]    3.  Forearm injury, left, initial encounter

## 2022-07-05 RX ORDER — GLIMEPIRIDE 4 MG/1
TABLET ORAL
Qty: 90 TABLET | Refills: 3 | Status: SHIPPED | OUTPATIENT
Start: 2022-07-05

## 2022-08-09 RX ORDER — EMPAGLIFLOZIN 25 MG/1
TABLET, FILM COATED ORAL
Qty: 90 TABLET | Refills: 3 | Status: SHIPPED | OUTPATIENT
Start: 2022-08-09

## 2022-08-22 RX ORDER — PRAVASTATIN SODIUM 40 MG/1
TABLET ORAL
Qty: 90 TABLET | Refills: 3 | Status: SHIPPED | OUTPATIENT
Start: 2022-08-22

## 2022-10-10 RX ORDER — SILDENAFIL 100 MG/1
TABLET, FILM COATED ORAL
Qty: 24 TABLET | Refills: 5 | Status: SHIPPED | OUTPATIENT
Start: 2022-10-10

## 2022-10-20 ENCOUNTER — OFFICE VISIT (OUTPATIENT)
Dept: INTERNAL MEDICINE CLINIC | Age: 52
End: 2022-10-20
Payer: COMMERCIAL

## 2022-10-20 VITALS
TEMPERATURE: 97.7 F | RESPIRATION RATE: 18 BRPM | OXYGEN SATURATION: 98 % | SYSTOLIC BLOOD PRESSURE: 122 MMHG | BODY MASS INDEX: 28.79 KG/M2 | HEIGHT: 73 IN | WEIGHT: 217.2 LBS | DIASTOLIC BLOOD PRESSURE: 80 MMHG | HEART RATE: 87 BPM

## 2022-10-20 DIAGNOSIS — E11.9 TYPE 2 DIABETES MELLITUS WITHOUT COMPLICATION, WITHOUT LONG-TERM CURRENT USE OF INSULIN (HCC): Primary | ICD-10-CM

## 2022-10-20 DIAGNOSIS — E78.00 HYPERCHOLESTEROLEMIA: ICD-10-CM

## 2022-10-20 PROCEDURE — 99213 OFFICE O/P EST LOW 20 MIN: CPT | Performed by: INTERNAL MEDICINE

## 2022-10-20 NOTE — PROGRESS NOTES
SUBJECTIVE: Brandon Carrillo is a 45 y.o. male seen for a follow up visit; he has diabetes, hyperlipidemia and no known cardiovascular conditions. Current Outpatient Medications   Medication Sig Dispense Refill    sildenafil citrate (VIAGRA) 100 mg tablet TAKE AS NEEDED 24 Tablet 5    pravastatin (PRAVACHOL) 40 mg tablet TAKE 1 TABLET NIGHTLY 90 Tablet 3    Jardiance 25 mg tablet TAKE 1 TABLET DAILY 90 Tablet 3    glimepiride (AMARYL) 4 mg tablet TAKE 1 TABLET BY MOUTH EVERY MORNING (NEED TO SEE DR BEFORE MORE REFILLS) 90 Tablet 3    metFORMIN ER (GLUCOPHAGE XR) 500 mg tablet TAKE 4 TABLETS BY MOUTH DAILY WITH DINNER 360 Tablet 3    aspirin 81 mg chewable tablet CHEW AND SWALLOW 1 TABLET DAILY 108 Tablet 3    valACYclovir (VALTREX) 500 mg tablet Take 1 Tab by mouth daily. 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    Hypercholesterolemia E78.00    ED (erectile dysfunction) N52.9    Acute superficial venous thrombosis of lower extremity I82.819    Myalgia M79.10    Type 2 diabetes mellitus without complication (HCC) O87.5    Statin intolerance Z78.9    Family history of prostate cancer Z80.42     System Review: Cardiovascular ROS - taking medications as instructed, no medication side effects noted, patient does not perform home BP monitoring, no TIA's, no chest pain on exertion, no dyspnea on exertion, no swelling of ankles, Diabetic ROS - medication compliance: compliant all of the time, diabetic diet compliance: noncompliant some of the time, home glucose monitoring: is not performed. suggest he do sodoing fine questions covuid covid vaccinations reviewed and priority reviewed and my advice to get vaccinated reviewed    New concerns: lnone    OBJECTIVE:  Visit Vitals  /80   Pulse 87   Temp 97.7 °F (36.5 °C) (Temporal)   Resp 18   Ht 6' 1\" (1.854 m)   Wt 217 lb 3.2 oz (98.5 kg)   SpO2 98%   BMI 28.66 kg/m²      BP Readings from Last 3 Encounters:   10/20/22 122/80   04/20/22 130/88   09/13/21 124/80       Appearance: alert, well appearing, and in no distress, oriented to person, place, and time and normal appearing weight. General exam: CVS exam BP noted to be well controlled today in office, S1, S2 normal, no gallop, no murmur, chest clear, no JVD, no HSM, no edema, left foream no bruise no swelling tender  Upper forearm pain with ext rotation. Lab review: labs reviewed, I note that glycosylated hemoglobin abnormal   Lab Results   Component Value Date/Time    Hemoglobin A1c 8.1 (H) 04/18/2022 01:57 PM    Hemoglobin A1c (POC) 8.4 11/18/2019 02:35 PM     , lipids LDL result does not yet meet goal.     ASSESSMENT:  diabetes borderline controlled, hyperlipidemia asymptomatic, reasonably well controlled, injury advise no lifting till better. PLAN:  current treatment plan is effective, no change in therapy  lab results and schedule of future lab studies reviewed with patient  repeat labs ordered prior to next appointment  orders and follow up as documented in patient record  reviewed diet, exercise and weight control  recommended sodium restriction  cardiovascular risk and specific lipid/LDL goals reviewed  offered trulicity as a possible . Diagnoses and all orders for this visit:    1. Type 2 diabetes mellitus without complication, without long-term current use of insulin ([facility] [address]    2. Hypercholesterolemia    . refills  Labs pending will do 3 days

## 2023-01-02 RX ORDER — GUAIFENESIN 100 MG/5ML
LIQUID (ML) ORAL
Qty: 108 TABLET | Refills: 3 | Status: SHIPPED | OUTPATIENT
Start: 2023-01-02

## 2023-03-31 ENCOUNTER — PATIENT MESSAGE (OUTPATIENT)
Dept: INTERNAL MEDICINE CLINIC | Age: 53
End: 2023-03-31

## 2023-03-31 DIAGNOSIS — E11.9 TYPE 2 DIABETES MELLITUS WITHOUT COMPLICATION, WITHOUT LONG-TERM CURRENT USE OF INSULIN (HCC): Primary | ICD-10-CM

## 2023-04-20 LAB
ALBUMIN SERPL-MCNC: 4.9 G/DL (ref 3.8–4.9)
ALBUMIN/CREAT UR: 20 MG/G CREAT (ref 0–29)
ALBUMIN/GLOB SERPL: 2 {RATIO} (ref 1.2–2.2)
ALP SERPL-CCNC: 74 IU/L (ref 44–121)
ALT SERPL-CCNC: 25 IU/L (ref 0–44)
AST SERPL-CCNC: 20 IU/L (ref 0–40)
BASOPHILS # BLD AUTO: 0.1 X10E3/UL (ref 0–0.2)
BASOPHILS NFR BLD AUTO: 1 %
BILIRUB SERPL-MCNC: 0.8 MG/DL (ref 0–1.2)
BUN SERPL-MCNC: 16 MG/DL (ref 6–24)
BUN/CREAT SERPL: 15 (ref 9–20)
CALCIUM SERPL-MCNC: 10 MG/DL (ref 8.7–10.2)
CHLORIDE SERPL-SCNC: 100 MMOL/L (ref 96–106)
CHOLEST SERPL-MCNC: 214 MG/DL (ref 100–199)
CO2 SERPL-SCNC: 24 MMOL/L (ref 20–29)
CREAT SERPL-MCNC: 1.09 MG/DL (ref 0.76–1.27)
CREAT UR-MCNC: 52.2 MG/DL
EGFRCR SERPLBLD CKD-EPI 2021: 81 ML/MIN/1.73
EOSINOPHIL # BLD AUTO: 0.1 X10E3/UL (ref 0–0.4)
EOSINOPHIL NFR BLD AUTO: 2 %
ERYTHROCYTE [DISTWIDTH] IN BLOOD BY AUTOMATED COUNT: 13.6 % (ref 11.6–15.4)
EST. AVERAGE GLUCOSE BLD GHB EST-MCNC: 283 MG/DL
GLOBULIN SER CALC-MCNC: 2.4 G/DL (ref 1.5–4.5)
GLUCOSE SERPL-MCNC: 243 MG/DL (ref 70–99)
HBA1C MFR BLD: 11.5 % (ref 4.8–5.6)
HCT VFR BLD AUTO: 45.6 % (ref 37.5–51)
HDLC SERPL-MCNC: 43 MG/DL
HGB BLD-MCNC: 15.1 G/DL (ref 13–17.7)
IMM GRANULOCYTES # BLD AUTO: 0 X10E3/UL (ref 0–0.1)
IMM GRANULOCYTES NFR BLD AUTO: 0 %
IMP & REVIEW OF LAB RESULTS: NORMAL
LDLC SERPL CALC-MCNC: 128 MG/DL (ref 0–99)
LYMPHOCYTES # BLD AUTO: 2.9 X10E3/UL (ref 0.7–3.1)
LYMPHOCYTES NFR BLD AUTO: 38 %
MCH RBC QN AUTO: 27.3 PG (ref 26.6–33)
MCHC RBC AUTO-ENTMCNC: 33.1 G/DL (ref 31.5–35.7)
MCV RBC AUTO: 83 FL (ref 79–97)
MICROALBUMIN UR-MCNC: 10.3 UG/ML
MONOCYTES # BLD AUTO: 0.6 X10E3/UL (ref 0.1–0.9)
MONOCYTES NFR BLD AUTO: 8 %
NEUTROPHILS # BLD AUTO: 3.9 X10E3/UL (ref 1.4–7)
NEUTROPHILS NFR BLD AUTO: 51 %
PLATELET # BLD AUTO: 312 X10E3/UL (ref 150–450)
POTASSIUM SERPL-SCNC: 4.9 MMOL/L (ref 3.5–5.2)
PROT SERPL-MCNC: 7.3 G/DL (ref 6–8.5)
RBC # BLD AUTO: 5.53 X10E6/UL (ref 4.14–5.8)
SODIUM SERPL-SCNC: 141 MMOL/L (ref 134–144)
TRIGL SERPL-MCNC: 242 MG/DL (ref 0–149)
VLDLC SERPL CALC-MCNC: 43 MG/DL (ref 5–40)
WBC # BLD AUTO: 7.5 X10E3/UL (ref 3.4–10.8)

## 2023-04-21 ENCOUNTER — OFFICE VISIT (OUTPATIENT)
Dept: INTERNAL MEDICINE CLINIC | Age: 53
End: 2023-04-21

## 2023-04-21 VITALS
RESPIRATION RATE: 20 BRPM | DIASTOLIC BLOOD PRESSURE: 78 MMHG | OXYGEN SATURATION: 96 % | HEIGHT: 73 IN | HEART RATE: 85 BPM | BODY MASS INDEX: 28.55 KG/M2 | SYSTOLIC BLOOD PRESSURE: 142 MMHG | TEMPERATURE: 97.5 F | WEIGHT: 215.4 LBS

## 2023-04-21 DIAGNOSIS — E78.00 HYPERCHOLESTEROLEMIA: ICD-10-CM

## 2023-04-21 DIAGNOSIS — E11.9 TYPE 2 DIABETES MELLITUS WITHOUT COMPLICATION, WITHOUT LONG-TERM CURRENT USE OF INSULIN (HCC): Primary | ICD-10-CM

## 2023-04-21 RX ORDER — BLOOD SUGAR DIAGNOSTIC
STRIP MISCELLANEOUS
Qty: 30 STRIP | Refills: 11 | Status: SHIPPED | OUTPATIENT
Start: 2023-04-21

## 2023-04-21 NOTE — PROGRESS NOTES
Patient is here following up for diabetes. No chief complaint on file. There were no vitals filed for this visit. Current Outpatient Medications on File Prior to Visit   Medication Sig Dispense Refill    metFORMIN ER (GLUCOPHAGE XR) 500 mg tablet TAKE 4 TABLETS BY MOUTH DAILY WITH DINNER 360 Tablet 0    aspirin 81 mg chewable tablet CHEW AND SWALLOW 1 TABLET DAILY 108 Tablet 3    sildenafil citrate (VIAGRA) 100 mg tablet TAKE AS NEEDED 24 Tablet 5    pravastatin (PRAVACHOL) 40 mg tablet TAKE 1 TABLET NIGHTLY 90 Tablet 3    Jardiance 25 mg tablet TAKE 1 TABLET DAILY 90 Tablet 3    glimepiride (AMARYL) 4 mg tablet TAKE 1 TABLET BY MOUTH EVERY MORNING (NEED TO SEE DR BEFORE MORE REFILLS) 90 Tablet 3    valACYclovir (VALTREX) 500 mg tablet Take 1 Tab by mouth daily. 90 Tab 1    glucose blood VI test strips (OneTouch Ultra Test) strip Check blood glucose once daily e11.9 30 Strip 11     No current facility-administered medications on file prior to visit. Health Maintenance Due   Topic    Hepatitis C Screening     COVID-19 Vaccine (1)    Pneumococcal 0-64 years (1 - PCV)    Hepatitis B Vaccine (1 of 3 - Risk 3-dose series)    Shingles Vaccine (1 of 2)    DTaP/Tdap/Td series (2 - Td or Tdap)    Foot Exam Q1     Depression Screen        1. \"Have you been to the ER, urgent care clinic since your last visit? Hospitalized since your last visit? \" No    2. \"Have you seen or consulted any other health care providers outside of the [address] [place] [address] since your last visit? \" Yes patient had eye exam Yoli Constant    3. For patients aged 39-70: Has the patient had a colonoscopy / FIT/ Cologuard? Yes - no Care Gap present      If the patient is female:    4. For patients aged 41-77: Has the patient had a mammogram within the past 2 years? NA - based on age or sex      11. For patients aged 21-65: Has the patient had a pap smear?  NA - based on age or sex

## 2023-04-21 NOTE — PROGRESS NOTES
Yaquelin Calderon. is a 45 y.o. male  Chief Complaint   Patient presents with    Follow-up     d    Diabetes     Visit Vitals  BP (!) 142/78 (BP 1 Location: Right upper arm, BP Patient Position: Sitting, BP Cuff Size: Adult long)   Pulse 85   Temp 97.5 °F (36.4 °C) (Temporal)   Resp 20   Ht 6' 1\" (1.854 m)   Wt 215 lb 6.4 oz (97.7 kg)   SpO2 96%   BMI 28.42 kg/m²          HPI  Mr.Ronald NORIS Walton Jr. Is a 45 y. o. with history of DM who presented to follow up on DM. He is doing well, in the last 6 months, his Diet was not as good as they should and he didn't exercise as well. His A1c that was done 2 days prior to presentation went up to 11.3 and he doesn't mind using additional medication. He prefers weekly injection and started on ozempic. He takes metformin 4 tablets daily and jardiance 25 and glimepiride and he had taken the regiments for long time. His A1c always is trending up. Discussed about exercising and diet and additional medication. Past Medical History:   Diagnosis Date    Acute superficial venous thrombosis of lower extremity 8/23/13    LLE, extensive to greater saphenous vein    Diabetes ([address]     Genital HSV     Hypercholesterolemia         ROS  Review of Systems   All other systems reviewed and are negative. EXAM  Physical Exam  Vitals reviewed. Constitutional:       General: He is not in acute distress. Appearance: Normal appearance. HENT:      Head: Normocephalic and atraumatic. Cardiovascular:      Rate and Rhythm: Normal rate and regular rhythm. Heart sounds: Normal heart sounds. No murmur heard. Pulmonary:      Effort: Pulmonary effort is normal. No respiratory distress. Breath sounds: Normal breath sounds. Abdominal:      General: Abdomen is flat. Bowel sounds are normal. There is no distension. Palpations: Abdomen is soft. Musculoskeletal:      Right lower leg: No edema. Left lower leg: No edema.    Neurological:      General: No focal deficit present. Mental Status: He is alert. Psychiatric:         Mood and Affect: Mood normal.     Health Maintenance Due   Topic Date Due    Hepatitis C Screening  Never done    COVID-19 Vaccine (1) 02/04/1971    Pneumococcal 0-64 years (1 - PCV) Never done    Hepatitis B Vaccine (1 of 3 - Risk 3-dose series) Never done    Shingles Vaccine (1 of 2) Never done    DTaP/Tdap/Td series (2 - Td or Tdap) 08/05/2020    Foot Exam Q1  03/16/2021       ASSESSMENT/PLAN    Diagnoses and all orders for this visit:    1. Type 2 diabetes mellitus without complication, without long-term current use of insulin (HCC)  Comments:  uncontrolled   diet and exercise encouraged  add ozempic     Orders:  -     glucose blood VI test strips (OneTouch Ultra Test) strip; Check blood glucose once daily e11.9  -     semaglutide (OZEMPIC) 0.25 mg or 0.5 mg/dose (2 mg/1.5 ml) subq pen; 0.25 mg by SubCUTAneous route every seven (7) days. -      DIABETES EDUCATION    2.  Hypercholesterolemia  Comments:  is not on goal  continue current statin   aerobic exercise and low fat diet           Colleen Chavez MD

## 2023-06-09 RX ORDER — SEMAGLUTIDE 0.68 MG/ML
INJECTION, SOLUTION SUBCUTANEOUS
Status: CANCELLED | OUTPATIENT
Start: 2023-06-09

## 2023-06-09 RX ORDER — SEMAGLUTIDE 0.68 MG/ML
INJECTION, SOLUTION SUBCUTANEOUS
COMMUNITY
Start: 2023-04-26 | End: 2023-06-09 | Stop reason: ALTCHOICE

## 2023-06-09 RX ORDER — SEMAGLUTIDE 0.68 MG/ML
0.5 INJECTION, SOLUTION SUBCUTANEOUS
Qty: 9 ML | Refills: 0 | Status: SHIPPED | OUTPATIENT
Start: 2023-06-09

## 2023-06-09 NOTE — TELEPHONE ENCOUNTER
Needs refill for [facility]TAIN GUALBERTO PORTILLO [facility]ER[facility]) please send [email or web address] message once sent to pharmacy

## 2023-06-15 RX ORDER — SEMAGLUTIDE 0.68 MG/ML
0.5 INJECTION, SOLUTION SUBCUTANEOUS
Qty: 9 ML | Refills: 0 | Status: CANCELLED | OUTPATIENT
Start: 2023-06-15

## 2023-06-16 NOTE — TELEPHONE ENCOUNTER
[facility] asking for clarification on directions for semaglutide. Directions say to inject 0.5 ml, but they should say 0.5 mg. Pharmacy is requesting a callback with clarification. Reference number is [id number]. Or we can just send new prescription, as old prescription will be expiring soon.

## 2023-06-19 RX ORDER — SEMAGLUTIDE 0.68 MG/ML
0.5 INJECTION, SOLUTION SUBCUTANEOUS
Qty: 9 ML | Refills: 0 | Status: SHIPPED | OUTPATIENT
Start: 2023-06-19

## 2023-06-29 RX ORDER — GLIMEPIRIDE 4 MG/1
TABLET ORAL
Qty: 90 TABLET | Refills: 3 | Status: SHIPPED | OUTPATIENT
Start: 2023-06-29

## 2023-06-29 RX ORDER — METFORMIN HYDROCHLORIDE 500 MG/1
TABLET, EXTENDED RELEASE ORAL
Qty: 360 TABLET | Refills: 3 | Status: SHIPPED | OUTPATIENT
Start: 2023-06-29

## 2023-07-19 ENCOUNTER — PATIENT MESSAGE (OUTPATIENT)
Age: 53
End: 2023-07-19

## 2023-07-19 DIAGNOSIS — E11.9 TYPE 2 DIABETES MELLITUS WITHOUT COMPLICATION, WITHOUT LONG-TERM CURRENT USE OF INSULIN (HCC): Primary | ICD-10-CM

## 2023-07-19 DIAGNOSIS — E78.00 PURE HYPERCHOLESTEROLEMIA, UNSPECIFIED: ICD-10-CM

## 2023-07-21 ENCOUNTER — OFFICE VISIT (OUTPATIENT)
Age: 53
End: 2023-07-21
Payer: COMMERCIAL

## 2023-07-21 VITALS
DIASTOLIC BLOOD PRESSURE: 77 MMHG | OXYGEN SATURATION: 99 % | HEART RATE: 82 BPM | WEIGHT: 207 LBS | BODY MASS INDEX: 27.43 KG/M2 | RESPIRATION RATE: 20 BRPM | HEIGHT: 73 IN | SYSTOLIC BLOOD PRESSURE: 119 MMHG | TEMPERATURE: 98 F

## 2023-07-21 DIAGNOSIS — M79.602 ARM PAIN, ANTERIOR, LEFT: Primary | ICD-10-CM

## 2023-07-21 DIAGNOSIS — D22.9 BENIGN SKIN MOLE: ICD-10-CM

## 2023-07-21 DIAGNOSIS — Z11.59 NEED FOR HEPATITIS B SCREENING TEST: ICD-10-CM

## 2023-07-21 DIAGNOSIS — Z11.59 NEED FOR HEPATITIS C SCREENING TEST: ICD-10-CM

## 2023-07-21 DIAGNOSIS — E11.9 TYPE 2 DIABETES MELLITUS WITHOUT COMPLICATION, WITHOUT LONG-TERM CURRENT USE OF INSULIN (HCC): ICD-10-CM

## 2023-07-21 DIAGNOSIS — Z11.4 ENCOUNTER FOR SCREENING FOR HIV: ICD-10-CM

## 2023-07-21 DIAGNOSIS — E78.00 HYPERCHOLESTEROLEMIA: ICD-10-CM

## 2023-07-21 LAB
ALBUMIN SERPL-MCNC: 4.7 G/DL (ref 3.8–4.9)
ALBUMIN/GLOB SERPL: 2 {RATIO} (ref 1.2–2.2)
ALP SERPL-CCNC: 62 IU/L (ref 44–121)
ALT SERPL-CCNC: 13 IU/L (ref 0–44)
AST SERPL-CCNC: 15 IU/L (ref 0–40)
BILIRUB SERPL-MCNC: 0.8 MG/DL (ref 0–1.2)
BUN SERPL-MCNC: 15 MG/DL (ref 6–24)
BUN/CREAT SERPL: 15 (ref 9–20)
CALCIUM SERPL-MCNC: 9.5 MG/DL (ref 8.7–10.2)
CHLORIDE SERPL-SCNC: 102 MMOL/L (ref 96–106)
CHOLEST SERPL-MCNC: 152 MG/DL (ref 100–199)
CO2 SERPL-SCNC: 22 MMOL/L (ref 20–29)
CREAT SERPL-MCNC: 1.03 MG/DL (ref 0.76–1.27)
EGFRCR SERPLBLD CKD-EPI 2021: 86 ML/MIN/1.73
GLOBULIN SER CALC-MCNC: 2.4 G/DL (ref 1.5–4.5)
GLUCOSE SERPL-MCNC: 146 MG/DL (ref 70–99)
HBA1C MFR BLD: 8.6 % (ref 4.8–5.6)
HDLC SERPL-MCNC: 42 MG/DL
IMP & REVIEW OF LAB RESULTS: NORMAL
LDLC SERPL CALC-MCNC: 93 MG/DL (ref 0–99)
POTASSIUM SERPL-SCNC: 3.9 MMOL/L (ref 3.5–5.2)
PROT SERPL-MCNC: 7.1 G/DL (ref 6–8.5)
SODIUM SERPL-SCNC: 141 MMOL/L (ref 134–144)
TRIGL SERPL-MCNC: 92 MG/DL (ref 0–149)
VLDLC SERPL CALC-MCNC: 17 MG/DL (ref 5–40)

## 2023-07-21 PROCEDURE — 3052F HG A1C>EQUAL 8.0%<EQUAL 9.0%: CPT | Performed by: INTERNAL MEDICINE

## 2023-07-21 PROCEDURE — 99214 OFFICE O/P EST MOD 30 MIN: CPT | Performed by: INTERNAL MEDICINE

## 2023-07-21 RX ORDER — BLOOD SUGAR DIAGNOSTIC
STRIP MISCELLANEOUS
COMMUNITY
Start: 2023-04-26

## 2023-07-21 SDOH — ECONOMIC STABILITY: HOUSING INSECURITY
IN THE LAST 12 MONTHS, WAS THERE A TIME WHEN YOU DID NOT HAVE A STEADY PLACE TO SLEEP OR SLEPT IN A SHELTER (INCLUDING NOW)?: NO

## 2023-07-21 SDOH — ECONOMIC STABILITY: INCOME INSECURITY: HOW HARD IS IT FOR YOU TO PAY FOR THE VERY BASICS LIKE FOOD, HOUSING, MEDICAL CARE, AND HEATING?: NOT HARD AT ALL

## 2023-07-21 SDOH — ECONOMIC STABILITY: FOOD INSECURITY: WITHIN THE PAST 12 MONTHS, YOU WORRIED THAT YOUR FOOD WOULD RUN OUT BEFORE YOU GOT MONEY TO BUY MORE.: NEVER TRUE

## 2023-07-21 SDOH — ECONOMIC STABILITY: FOOD INSECURITY: WITHIN THE PAST 12 MONTHS, THE FOOD YOU BOUGHT JUST DIDN'T LAST AND YOU DIDN'T HAVE MONEY TO GET MORE.: NEVER TRUE

## 2023-07-21 ASSESSMENT — ENCOUNTER SYMPTOMS
SHORTNESS OF BREATH: 0
COUGH: 0

## 2023-07-21 NOTE — PROGRESS NOTES
Patient is here for diabetes follow up. Patient also is having left arm discomfort that has been present for a few months now. Patient also would like to see a dermatologist about moles on his back. 1. \"Have you been to the ER, urgent care clinic since your last visit? Hospitalized since your last visit? \" No    2. \"Have you seen or consulted any other health care providers outside of the [address] [place] [address] since your last visit? \" No     3. For patients aged 43-73: Has the patient had a colonoscopy / FIT/ Cologuard? Yes - no Care Gap present      If the patient is female:    4. For patients aged 43-66: Has the patient had a mammogram within the past 2 years? NA - based on age or sex      11. For patients aged 21-65: Has the patient had a pap smear?  NA - based on age or sex

## 2023-08-04 RX ORDER — EMPAGLIFLOZIN 25 MG/1
TABLET, FILM COATED ORAL
Qty: 90 TABLET | Refills: 3 | Status: SHIPPED | OUTPATIENT
Start: 2023-08-04

## 2023-08-17 RX ORDER — PRAVASTATIN SODIUM 40 MG
TABLET ORAL
Qty: 90 TABLET | Refills: 3 | Status: SHIPPED | OUTPATIENT
Start: 2023-08-17

## 2023-08-21 DIAGNOSIS — Z11.4 ENCOUNTER FOR SCREENING FOR HIV: ICD-10-CM

## 2023-08-21 DIAGNOSIS — E78.00 HYPERCHOLESTEROLEMIA: ICD-10-CM

## 2023-08-21 DIAGNOSIS — E11.9 TYPE 2 DIABETES MELLITUS WITHOUT COMPLICATION, WITHOUT LONG-TERM CURRENT USE OF INSULIN (HCC): ICD-10-CM

## 2023-08-21 DIAGNOSIS — Z11.59 NEED FOR HEPATITIS B SCREENING TEST: ICD-10-CM

## 2023-08-21 DIAGNOSIS — Z11.59 NEED FOR HEPATITIS C SCREENING TEST: ICD-10-CM

## 2023-10-28 LAB
ALBUMIN SERPL-MCNC: 4.9 G/DL (ref 3.8–4.9)
ALBUMIN/GLOB SERPL: 2.1 {RATIO} (ref 1.2–2.2)
ALP SERPL-CCNC: 54 IU/L (ref 44–121)
ALT SERPL-CCNC: 12 IU/L (ref 0–44)
AST SERPL-CCNC: 16 IU/L (ref 0–40)
BILIRUB SERPL-MCNC: 1 MG/DL (ref 0–1.2)
BUN SERPL-MCNC: 12 MG/DL (ref 6–24)
BUN/CREAT SERPL: 13 (ref 9–20)
CALCIUM SERPL-MCNC: 9.5 MG/DL (ref 8.7–10.2)
CHLORIDE SERPL-SCNC: 101 MMOL/L (ref 96–106)
CHOLEST SERPL-MCNC: 139 MG/DL (ref 100–199)
CO2 SERPL-SCNC: 23 MMOL/L (ref 20–29)
CREAT SERPL-MCNC: 0.92 MG/DL (ref 0.76–1.27)
EGFRCR SERPLBLD CKD-EPI 2021: 98 ML/MIN/1.73
GLOBULIN SER CALC-MCNC: 2.3 G/DL (ref 1.5–4.5)
GLUCOSE SERPL-MCNC: 116 MG/DL (ref 70–99)
HBA1C MFR BLD: 7.1 % (ref 4.8–5.6)
HBV SURFACE AG SERPL QL IA: NEGATIVE
HCV IGG SERPL QL IA: NON REACTIVE
HDLC SERPL-MCNC: 46 MG/DL
HIV 1+2 AB+HIV1 P24 AG SERPL QL IA: NON REACTIVE
IMP & REVIEW OF LAB RESULTS: NORMAL
LDLC SERPL CALC-MCNC: 77 MG/DL (ref 0–99)
POTASSIUM SERPL-SCNC: 4.5 MMOL/L (ref 3.5–5.2)
PROT SERPL-MCNC: 7.2 G/DL (ref 6–8.5)
SODIUM SERPL-SCNC: 140 MMOL/L (ref 134–144)
TRIGL SERPL-MCNC: 84 MG/DL (ref 0–149)
VLDLC SERPL CALC-MCNC: 16 MG/DL (ref 5–40)

## 2023-10-31 ENCOUNTER — OFFICE VISIT (OUTPATIENT)
Age: 53
End: 2023-10-31
Payer: COMMERCIAL

## 2023-10-31 VITALS
WEIGHT: 207 LBS | HEIGHT: 73 IN | DIASTOLIC BLOOD PRESSURE: 88 MMHG | BODY MASS INDEX: 27.43 KG/M2 | HEART RATE: 92 BPM | TEMPERATURE: 97.9 F | SYSTOLIC BLOOD PRESSURE: 124 MMHG | OXYGEN SATURATION: 98 %

## 2023-10-31 DIAGNOSIS — E11.9 TYPE 2 DIABETES MELLITUS WITHOUT COMPLICATION, WITHOUT LONG-TERM CURRENT USE OF INSULIN (HCC): Primary | ICD-10-CM

## 2023-10-31 PROCEDURE — 99213 OFFICE O/P EST LOW 20 MIN: CPT | Performed by: INTERNAL MEDICINE

## 2023-10-31 PROCEDURE — 3051F HG A1C>EQUAL 7.0%<8.0%: CPT | Performed by: INTERNAL MEDICINE

## 2023-10-31 RX ORDER — SEMAGLUTIDE 1.34 MG/ML
1 INJECTION, SOLUTION SUBCUTANEOUS
Qty: 12 ML | Refills: 1 | Status: SHIPPED | OUTPATIENT
Start: 2023-10-31

## 2023-10-31 RX ORDER — PRAVASTATIN SODIUM 80 MG/1
80 TABLET ORAL NIGHTLY
Qty: 90 TABLET | Refills: 3 | Status: SHIPPED | OUTPATIENT
Start: 2023-10-31

## 2023-12-26 RX ORDER — ASPIRIN 81 MG/1
TABLET, CHEWABLE ORAL
Qty: 108 TABLET | Refills: 3 | Status: SHIPPED | OUTPATIENT
Start: 2023-12-26

## 2024-01-08 ENCOUNTER — OFFICE VISIT (OUTPATIENT)
Age: 54
End: 2024-01-08
Payer: COMMERCIAL

## 2024-01-08 ENCOUNTER — HOSPITAL ENCOUNTER (OUTPATIENT)
Facility: HOSPITAL | Age: 54
Discharge: HOME | End: 2024-01-11
Payer: COMMERCIAL

## 2024-01-08 VITALS
DIASTOLIC BLOOD PRESSURE: 82 MMHG | SYSTOLIC BLOOD PRESSURE: 130 MMHG | HEIGHT: 73 IN | BODY MASS INDEX: 27.57 KG/M2 | OXYGEN SATURATION: 98 % | TEMPERATURE: 97.7 F | WEIGHT: 208 LBS | RESPIRATION RATE: 12 BRPM | HEART RATE: 96 BPM

## 2024-01-08 DIAGNOSIS — M79.18 GLUTEAL PAIN: Primary | ICD-10-CM

## 2024-01-08 DIAGNOSIS — M79.18 GLUTEAL PAIN: ICD-10-CM

## 2024-01-08 PROCEDURE — 99213 OFFICE O/P EST LOW 20 MIN: CPT | Performed by: INTERNAL MEDICINE

## 2024-01-08 PROCEDURE — 72170 X-RAY EXAM OF PELVIS: CPT

## 2024-01-08 ASSESSMENT — PATIENT HEALTH QUESTIONNAIRE - PHQ9
1. LITTLE INTEREST OR PLEASURE IN DOING THINGS: 0
SUM OF ALL RESPONSES TO PHQ QUESTIONS 1-9: 0
SUM OF ALL RESPONSES TO PHQ9 QUESTIONS 1 & 2: 0
2. FEELING DOWN, DEPRESSED OR HOPELESS: 0

## 2024-01-08 ASSESSMENT — ENCOUNTER SYMPTOMS
SHORTNESS OF BREATH: 0
COUGH: 0

## 2024-01-08 NOTE — PROGRESS NOTES
Jose Waddell Jr. is a 53 y.o. male  Chief Complaint   Patient presents with    Leg Pain     Started 1/2/2024 - leg feels tight, couldn't sit tender to the touch right side glute        Vitals:    01/08/24 1446   BP: 130/82   Pulse:    Resp:    Temp:    SpO2:           HPI  Mr.Ronald SHRUTI Waddell Jr. 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 The pain is sharp and is tender around the area of pain. Tried icy hot with minimal relief.     Past Medical History:   Diagnosis Date    Acute superficial venous thrombosis of lower extremity 8/23/13    LLE, extensive to greater saphenous vein    Diabetes (HCC)     Genital HSV     Hypercholesterolemia             ROS  Review of Systems   Constitutional:  Negative for fever.   Respiratory:  Negative for cough and shortness of breath.    Cardiovascular:  Negative for chest pain and palpitations.   Neurological:  Negative for headaches.   Psychiatric/Behavioral:  Negative for dysphoric mood.            EXAM  Physical Exam  Vitals and nursing note reviewed.   HENT:      Head: Normocephalic and atraumatic.   Cardiovascular:      Rate and Rhythm: Normal rate and regular rhythm.      Pulses: Normal pulses.      Heart sounds: Normal heart sounds. No murmur heard.  Pulmonary:      Effort: Pulmonary effort is normal. No respiratory distress.      Breath sounds: Normal breath sounds.   Musculoskeletal:      Right lower leg: No edema.      Left lower leg: No edema.        Legs:       Comments: tender   Neurological:      Mental Status: He is alert.   Psychiatric:         Mood and Affect: Mood normal.         Behavior: Behavior normal.         Thought Content: Thought content normal.         Judgment: Judgment normal.         Health Maintenance Due   Topic Date Due    Hepatitis B vaccine (1 of 3 - 3-dose series) Never done    Pneumococcal 0-64 years Vaccine (1 - PCV) Never done    Shingles vaccine (1 of 2) Never done

## 2024-01-29 ENCOUNTER — PATIENT MESSAGE (OUTPATIENT)
Age: 54
End: 2024-01-29

## 2024-01-29 ENCOUNTER — TELEPHONE (OUTPATIENT)
Age: 54
End: 2024-01-29

## 2024-01-29 DIAGNOSIS — E78.00 HYPERCHOLESTEROLEMIA: ICD-10-CM

## 2024-01-29 DIAGNOSIS — E11.9 TYPE 2 DIABETES MELLITUS WITHOUT COMPLICATION, WITHOUT LONG-TERM CURRENT USE OF INSULIN (HCC): ICD-10-CM

## 2024-01-29 DIAGNOSIS — E11.9 TYPE 2 DIABETES MELLITUS WITHOUT COMPLICATION, WITHOUT LONG-TERM CURRENT USE OF INSULIN (HCC): Primary | ICD-10-CM

## 2024-01-29 NOTE — TELEPHONE ENCOUNTER
From: oJse Waddell Jr.  To: Dr. Polo Reza  Sent: 1/29/2024 2:11 PM EST  Subject: Request Lab Orders    I have an appointment scheduled for this coming Thursday. Can the nurse send the orders for the Lab Test that need to be performed prior to my visit?  I use the Lab Core located in the [facility].

## 2024-01-29 NOTE — TELEPHONE ENCOUNTER
Pt is requesting labs to be sent over before his appt. He would like to be notified when they are sent over so he can have them done before his appt on 02/01.

## 2024-02-01 ENCOUNTER — OFFICE VISIT (OUTPATIENT)
Age: 54
End: 2024-02-01
Payer: COMMERCIAL

## 2024-02-01 VITALS
OXYGEN SATURATION: 96 % | TEMPERATURE: 97.9 F | BODY MASS INDEX: 28.88 KG/M2 | SYSTOLIC BLOOD PRESSURE: 140 MMHG | DIASTOLIC BLOOD PRESSURE: 80 MMHG | HEART RATE: 72 BPM | RESPIRATION RATE: 14 BRPM | WEIGHT: 213.2 LBS | HEIGHT: 72 IN

## 2024-02-01 DIAGNOSIS — Z12.5 PROSTATE CANCER SCREENING: ICD-10-CM

## 2024-02-01 DIAGNOSIS — E78.00 HYPERCHOLESTEROLEMIA: Primary | ICD-10-CM

## 2024-02-01 DIAGNOSIS — E11.9 TYPE 2 DIABETES MELLITUS WITHOUT COMPLICATION, WITHOUT LONG-TERM CURRENT USE OF INSULIN (HCC): ICD-10-CM

## 2024-02-01 LAB
ALBUMIN SERPL-MCNC: 4.7 G/DL (ref 3.8–4.9)
ALBUMIN/GLOB SERPL: 2 {RATIO} (ref 1.2–2.2)
ALP SERPL-CCNC: 57 IU/L (ref 44–121)
ALT SERPL-CCNC: 17 IU/L (ref 0–44)
AST SERPL-CCNC: 17 IU/L (ref 0–40)
BASOPHILS # BLD AUTO: 0 X10E3/UL (ref 0–0.2)
BASOPHILS NFR BLD AUTO: 1 %
BILIRUB SERPL-MCNC: 0.7 MG/DL (ref 0–1.2)
BUN SERPL-MCNC: 11 MG/DL (ref 6–24)
BUN/CREAT SERPL: 13 (ref 9–20)
CALCIUM SERPL-MCNC: 9.4 MG/DL (ref 8.7–10.2)
CHLORIDE SERPL-SCNC: 102 MMOL/L (ref 96–106)
CHOLEST SERPL-MCNC: 126 MG/DL (ref 100–199)
CO2 SERPL-SCNC: 23 MMOL/L (ref 20–29)
CREAT SERPL-MCNC: 0.88 MG/DL (ref 0.76–1.27)
EGFRCR SERPLBLD CKD-EPI 2021: 102 ML/MIN/1.73
EOSINOPHIL # BLD AUTO: 0.1 X10E3/UL (ref 0–0.4)
EOSINOPHIL NFR BLD AUTO: 2 %
ERYTHROCYTE [DISTWIDTH] IN BLOOD BY AUTOMATED COUNT: 13.9 % (ref 11.6–15.4)
GLOBULIN SER CALC-MCNC: 2.3 G/DL (ref 1.5–4.5)
GLUCOSE SERPL-MCNC: 140 MG/DL (ref 70–99)
HBA1C MFR BLD: 7.5 % (ref 4.8–5.6)
HCT VFR BLD AUTO: 41.4 % (ref 37.5–51)
HDLC SERPL-MCNC: 45 MG/DL
HGB BLD-MCNC: 13.9 G/DL (ref 13–17.7)
IMM GRANULOCYTES # BLD AUTO: 0 X10E3/UL (ref 0–0.1)
IMM GRANULOCYTES NFR BLD AUTO: 0 %
IMP & REVIEW OF LAB RESULTS: NORMAL
LDLC SERPL CALC-MCNC: 68 MG/DL (ref 0–99)
LYMPHOCYTES # BLD AUTO: 2.6 X10E3/UL (ref 0.7–3.1)
LYMPHOCYTES NFR BLD AUTO: 33 %
Lab: NORMAL
MCH RBC QN AUTO: 27.4 PG (ref 26.6–33)
MCHC RBC AUTO-ENTMCNC: 33.6 G/DL (ref 31.5–35.7)
MCV RBC AUTO: 82 FL (ref 79–97)
MONOCYTES # BLD AUTO: 0.5 X10E3/UL (ref 0.1–0.9)
MONOCYTES NFR BLD AUTO: 7 %
NEUTROPHILS # BLD AUTO: 4.6 X10E3/UL (ref 1.4–7)
NEUTROPHILS NFR BLD AUTO: 57 %
PLATELET # BLD AUTO: 341 X10E3/UL (ref 150–450)
POTASSIUM SERPL-SCNC: 4.6 MMOL/L (ref 3.5–5.2)
PROT SERPL-MCNC: 7 G/DL (ref 6–8.5)
RBC # BLD AUTO: 5.07 X10E6/UL (ref 4.14–5.8)
SODIUM SERPL-SCNC: 140 MMOL/L (ref 134–144)
TRIGL SERPL-MCNC: 59 MG/DL (ref 0–149)
VLDLC SERPL CALC-MCNC: 13 MG/DL (ref 5–40)
WBC # BLD AUTO: 8 X10E3/UL (ref 3.4–10.8)

## 2024-02-01 PROCEDURE — 99213 OFFICE O/P EST LOW 20 MIN: CPT | Performed by: INTERNAL MEDICINE

## 2024-02-01 PROCEDURE — 3051F HG A1C>EQUAL 7.0%<8.0%: CPT | Performed by: INTERNAL MEDICINE

## 2024-02-01 NOTE — PROGRESS NOTES
Chief Complaint   Patient presents with    Follow-up     3 mo follow up for diabetes- blood work done 1/31/2024,  wants to know when his next PSA test should be? Still wants to make sure he does not need to be taking his jardiance.     \"Have you been to the ER, urgent care clinic since your last visit?  Hospitalized since your last visit?\"    NO    “Have you seen or consulted any other health care providers outside of [facility] since your last visit?”    NO

## 2024-02-01 NOTE — PROGRESS NOTES
Lab Results   Component Value Date    PSA 1.0 09/10/2021     Chief Complaint   Patient presents with    Follow-up     3 mo follow up for diabetes- blood work done 1/31/2024,  wants to know when his next PSA test should be? Still wants to make sure he does not need to be taking his jardiance.     Doing great  Diet better  Walking no running 5 weeks since hamstring injury    Needs med refills      Cardiovascular ROS: no chest pain or dyspnea on exertion  Neurological ROS: no TIA or stroke symptoms  General ROS: negative for - chills, fatigue, fever, malaise, night sweats or sleep disturbance  Endocrine ROS: negative for - hair pattern changes, hot flashes, malaise/lethargy, palpitations, polydipsia/polyuria, temperature intolerance or unexpected weight changes  Seeing eye docs      Feet ok      Hemoglobin A1C   Date Value Ref Range Status   01/31/2024 7.5 (H) 4.8 - 5.6 % Final     Comment:                 Prediabetes: 5.7 - 6.4           Diabetes: >6.4           Glycemic control for adults with diabetes: <7.0       Lab Results   Component Value Date    CREATININE 0.88 01/31/2024    BUN 11 01/31/2024     01/31/2024    K 4.6 01/31/2024     01/31/2024    CO2 23 01/31/2024     No lows not checking much        Vitals:    02/01/24 1314 02/01/24 1326   BP: 127/78 (!) 140/80   Site: Left Upper Arm    Position: Sitting    Cuff Size: Medium Adult    Pulse: 72    Resp: 14    Temp: 97.9 °F (36.6 °C)    TempSrc: Temporal    SpO2: 96%    Weight: 96.7 kg (213 lb 3.2 oz)    Height: 1.829 m (6')      no apparent distress  S1 and S2 normal, no murmurs, clicks, gallops or rubs. Regular rate and rhythm. Chest is clear; no wheezes or rales. No edema or JVD.    Jose was seen today for follow-up.    Diagnoses and all orders for this visit:    Hypercholesterolemia    Type 2 diabetes mellitus without complication, without long-term current use of insulin (HCC)  -     Comprehensive Metabolic Panel; Future  -     Hemoglobin A1C;

## 2024-02-26 RX ORDER — SEMAGLUTIDE 1.34 MG/ML
1 INJECTION, SOLUTION SUBCUTANEOUS
Qty: 9 ML | Refills: 0 | Status: SHIPPED | OUTPATIENT
Start: 2024-02-26

## 2024-05-03 LAB — HBA1C MFR BLD: 8.5 % (ref 4.8–5.6)

## 2024-05-04 LAB
ALBUMIN SERPL-MCNC: 4.8 G/DL (ref 3.8–4.9)
ALBUMIN/GLOB SERPL: 1.8 {RATIO} (ref 1.2–2.2)
ALP SERPL-CCNC: 60 IU/L (ref 44–121)
ALT SERPL-CCNC: 24 IU/L (ref 0–44)
AST SERPL-CCNC: 21 IU/L (ref 0–40)
BILIRUB SERPL-MCNC: 1.1 MG/DL (ref 0–1.2)
BUN SERPL-MCNC: 11 MG/DL (ref 6–24)
BUN/CREAT SERPL: 11 (ref 9–20)
CALCIUM SERPL-MCNC: 9.6 MG/DL (ref 8.7–10.2)
CHLORIDE SERPL-SCNC: 98 MMOL/L (ref 96–106)
CHOLEST SERPL-MCNC: 158 MG/DL (ref 100–199)
CO2 SERPL-SCNC: 25 MMOL/L (ref 20–29)
CREAT SERPL-MCNC: 0.97 MG/DL (ref 0.76–1.27)
EGFRCR SERPLBLD CKD-EPI 2021: 92 ML/MIN/1.73
GLOBULIN SER CALC-MCNC: 2.7 G/DL (ref 1.5–4.5)
GLUCOSE SERPL-MCNC: 198 MG/DL (ref 70–99)
HDLC SERPL-MCNC: 42 MG/DL
IMP & REVIEW OF LAB RESULTS: NORMAL
LDLC SERPL CALC-MCNC: 95 MG/DL (ref 0–99)
Lab: NORMAL
POTASSIUM SERPL-SCNC: 4.4 MMOL/L (ref 3.5–5.2)
PROT SERPL-MCNC: 7.5 G/DL (ref 6–8.5)
PSA SERPL-MCNC: 1.3 NG/ML (ref 0–4)
SODIUM SERPL-SCNC: 137 MMOL/L (ref 134–144)
TRIGL SERPL-MCNC: 116 MG/DL (ref 0–149)
VLDLC SERPL CALC-MCNC: 21 MG/DL (ref 5–40)

## 2024-05-07 ENCOUNTER — OFFICE VISIT (OUTPATIENT)
Age: 54
End: 2024-05-07
Payer: COMMERCIAL

## 2024-05-07 VITALS
RESPIRATION RATE: 18 BRPM | TEMPERATURE: 97.9 F | HEART RATE: 78 BPM | WEIGHT: 214 LBS | OXYGEN SATURATION: 96 % | BODY MASS INDEX: 28.99 KG/M2 | HEIGHT: 72 IN | DIASTOLIC BLOOD PRESSURE: 80 MMHG | SYSTOLIC BLOOD PRESSURE: 137 MMHG

## 2024-05-07 DIAGNOSIS — E11.9 TYPE 2 DIABETES MELLITUS WITHOUT COMPLICATION, WITHOUT LONG-TERM CURRENT USE OF INSULIN (HCC): Primary | ICD-10-CM

## 2024-05-07 DIAGNOSIS — N52.9 ERECTILE DYSFUNCTION, UNSPECIFIED ERECTILE DYSFUNCTION TYPE: ICD-10-CM

## 2024-05-07 PROCEDURE — 3052F HG A1C>EQUAL 8.0%<EQUAL 9.0%: CPT | Performed by: INTERNAL MEDICINE

## 2024-05-07 PROCEDURE — 99213 OFFICE O/P EST LOW 20 MIN: CPT | Performed by: INTERNAL MEDICINE

## 2024-05-07 RX ORDER — SEMAGLUTIDE 1.34 MG/ML
1 INJECTION, SOLUTION SUBCUTANEOUS
Qty: 9 ML | Refills: 3 | Status: SHIPPED | OUTPATIENT
Start: 2024-05-07

## 2024-05-07 NOTE — PROGRESS NOTES
Chief Complaint   Patient presents with    Follow-up     Needs ozempic refill- No concerns      Patient has diabetes it had been well-controlled he dropped the Jardiance for 5 months ago thinking he was taking too many meds between other diabetic drugs Ozempic and metformin he has noticed increasing erectile dysfunction he is not sure if it is related to penile trauma that happened 9 months ago he dropped a dumbbell on his penis he said that 1 is a reactive sclera good and has had the last he has had more ED than he used to have he takes 100 mg of just of Viagra sometimes 200 mg when he is trying to have sex he had an injury at work.  He was seen in the [facility] ER for a laceration of the forehead that was treated related to his job as a .    Since he had the facial laceration he is cut back on running and jogging he has not done as much he still does not monitor his diabetes very well his A1c that was done on lab work just this week was 8-1/2 is his cholesterol was normal his blood pressure is mildly up compared to what it used to be his weight is up some from what it used to be      Patient Active Problem List    Diagnosis Date Noted    Family history of prostate cancer 07/23/2019    Statin intolerance 09/30/2016    Type 2 diabetes mellitus without complication (HCC) 12/02/2015    Myalgia 11/21/2014    Acute superficial venous thrombosis of lower extremity     ED (erectile dysfunction) 11/19/2012    Genital HSV     Hypercholesterolemia      Wt Readings from Last 3 Encounters:   05/07/24 97.1 kg (214 lb)   02/01/24 96.7 kg (213 lb 3.2 oz)   01/08/24 94.3 kg (208 lb)     Vitals:    05/07/24 1201 05/07/24 1249   BP: (!) 150/89 137/80   Site: Right Upper Arm    Position: Sitting    Cuff Size: Medium Adult    Pulse: 78    Resp: 18    Temp: 97.9 °F (36.6 °C)    TempSrc: Temporal    SpO2: 96%    Weight: 97.1 kg (214 lb)    Height: 1.829 m (6')      S1 and S2 normal, no murmurs, clicks, gallops or rubs. Regular

## 2024-05-07 NOTE — PROGRESS NOTES
I have reviewed all needed documentation in preparation for visit. Verified patient by name and date of birth  Chief Complaint   Patient presents with    Follow-up     Needs ozempic refill- No concerns        Vitals:    05/07/24 1201   BP: (!) 150/89   Site: Right Upper Arm   Position: Sitting   Cuff Size: Medium Adult   Pulse: 78   Resp: 18   Temp: 97.9 °F (36.6 °C)   TempSrc: Temporal   SpO2: 96%   Weight: 97.1 kg (214 lb)   Height: 1.829 m (6')       Health Maintenance Due   Topic Date Due    Hepatitis B vaccine (1 of 3 - 3-dose series) Never done    Pneumococcal 0-64 years Vaccine (1 of 2 - PCV) Never done    Shingles vaccine (1 of 2) Never done    DTaP/Tdap/Td vaccine (2 - Td or Tdap) 08/05/2020    Diabetic foot exam  03/16/2021    Diabetic retinal exam  01/27/2023    COVID-19 Vaccine (2 - 2023-24 season) 09/01/2023    Diabetic Alb to Cr ratio (uACR) test  04/19/2024     \"Have you been to the ER, urgent care clinic since your last visit?  Hospitalized since your last visit?\"    YES    “Have you seen or consulted any other health care providers outside of [facility] since your last visit?”    YES            Click Here for Release of Records Request         Benita humphries CMA

## 2024-06-24 RX ORDER — METFORMIN HYDROCHLORIDE 500 MG/1
TABLET, EXTENDED RELEASE ORAL
Qty: 360 TABLET | Refills: 3 | Status: SHIPPED | OUTPATIENT
Start: 2024-06-24

## 2024-06-24 RX ORDER — GLIMEPIRIDE 4 MG/1
TABLET ORAL
Qty: 90 TABLET | Refills: 3 | OUTPATIENT
Start: 2024-06-24

## 2024-08-10 ENCOUNTER — PATIENT MESSAGE (OUTPATIENT)
Age: 54
End: 2024-08-10

## 2024-08-10 DIAGNOSIS — E11.9 TYPE 2 DIABETES MELLITUS WITHOUT COMPLICATION, WITHOUT LONG-TERM CURRENT USE OF INSULIN (HCC): Primary | ICD-10-CM

## 2024-08-12 ENCOUNTER — TELEPHONE (OUTPATIENT)
Age: 54
End: 2024-08-12

## 2024-08-12 DIAGNOSIS — E11.9 TYPE 2 DIABETES MELLITUS WITHOUT COMPLICATION, WITHOUT LONG-TERM CURRENT USE OF INSULIN (HCC): ICD-10-CM

## 2024-08-12 RX ORDER — SEMAGLUTIDE 1.34 MG/ML
1 INJECTION, SOLUTION SUBCUTANEOUS
Qty: 9 ML | Refills: 3 | Status: SHIPPED | OUTPATIENT
Start: 2024-08-12

## 2024-08-12 NOTE — TELEPHONE ENCOUNTER
----- Message from Kar CROWLEY sent at 8/12/2024  9:48 AM EDT -----  Regarding: [facility] Referral Request  [facility] Referral Request    Reason for referral request: Lab/Test Order    Specialist/Lab/Test patient is requesting (if known):Blood work    Specialist Phone Number (if applicable):    Additional Information PT needs to have an order for his blood to be drawn.   --------------------------------------------------------------------------------------------------------------------------    Relationship to Patient: Self     Call Back Information: OK to leave message on voicemail  Preferred Call Back Number: Phone [phone] (home)

## 2024-08-21 LAB
ALBUMIN SERPL-MCNC: 4.7 G/DL (ref 3.8–4.9)
ALP SERPL-CCNC: 69 IU/L (ref 44–121)
ALT SERPL-CCNC: 17 IU/L (ref 0–44)
AST SERPL-CCNC: 15 IU/L (ref 0–40)
BILIRUB SERPL-MCNC: 0.6 MG/DL (ref 0–1.2)
BUN SERPL-MCNC: 13 MG/DL (ref 6–24)
BUN/CREAT SERPL: 15 (ref 9–20)
CALCIUM SERPL-MCNC: 9.8 MG/DL (ref 8.7–10.2)
CHLORIDE SERPL-SCNC: 100 MMOL/L (ref 96–106)
CHOLEST SERPL-MCNC: 145 MG/DL (ref 100–199)
CO2 SERPL-SCNC: 25 MMOL/L (ref 20–29)
CREAT SERPL-MCNC: 0.86 MG/DL (ref 0.76–1.27)
EGFRCR SERPLBLD CKD-EPI 2021: 102 ML/MIN/1.73
GLOBULIN SER CALC-MCNC: 2.6 G/DL (ref 1.5–4.5)
GLUCOSE SERPL-MCNC: 139 MG/DL (ref 70–99)
HBA1C MFR BLD: 8.3 % (ref 4.8–5.6)
HDLC SERPL-MCNC: 39 MG/DL
IMP & REVIEW OF LAB RESULTS: NORMAL
LDLC SERPL CALC-MCNC: 82 MG/DL (ref 0–99)
Lab: NORMAL
POTASSIUM SERPL-SCNC: 4.6 MMOL/L (ref 3.5–5.2)
PROT SERPL-MCNC: 7.3 G/DL (ref 6–8.5)
SODIUM SERPL-SCNC: 138 MMOL/L (ref 134–144)
TRIGL SERPL-MCNC: 132 MG/DL (ref 0–149)
VLDLC SERPL CALC-MCNC: 24 MG/DL (ref 5–40)

## 2024-10-07 RX ORDER — PRAVASTATIN SODIUM 80 MG/1
80 TABLET ORAL NIGHTLY
Qty: 90 TABLET | Refills: 3 | Status: SHIPPED | OUTPATIENT
Start: 2024-10-07

## 2024-10-26 RX ORDER — SEMAGLUTIDE 1.34 MG/ML
1 INJECTION, SOLUTION SUBCUTANEOUS
Qty: 9 ML | Refills: 3 | Status: SHIPPED | OUTPATIENT
Start: 2024-10-26

## 2024-10-27 DIAGNOSIS — E11.9 TYPE 2 DIABETES MELLITUS WITHOUT COMPLICATION, WITHOUT LONG-TERM CURRENT USE OF INSULIN (HCC): ICD-10-CM

## 2024-10-27 DIAGNOSIS — E78.00 HYPERCHOLESTEROLEMIA: ICD-10-CM

## 2024-10-31 LAB
ALBUMIN SERPL-MCNC: 4.6 G/DL (ref 3.8–4.9)
ALBUMIN/CREAT UR: 13 MG/G CREAT (ref 0–29)
ALP SERPL-CCNC: 63 IU/L (ref 44–121)
ALT SERPL-CCNC: 20 IU/L (ref 0–44)
AST SERPL-CCNC: 19 IU/L (ref 0–40)
BILIRUB SERPL-MCNC: 1.1 MG/DL (ref 0–1.2)
BUN SERPL-MCNC: 12 MG/DL (ref 6–24)
BUN/CREAT SERPL: 13 (ref 9–20)
CALCIUM SERPL-MCNC: 9.6 MG/DL (ref 8.7–10.2)
CHLORIDE SERPL-SCNC: 103 MMOL/L (ref 96–106)
CHOLEST SERPL-MCNC: 157 MG/DL (ref 100–199)
CO2 SERPL-SCNC: 21 MMOL/L (ref 20–29)
CREAT SERPL-MCNC: 0.93 MG/DL (ref 0.76–1.27)
CREAT UR-MCNC: 110.4 MG/DL
EGFRCR SERPLBLD CKD-EPI 2021: 96 ML/MIN/1.73
GLOBULIN SER CALC-MCNC: 2.6 G/DL (ref 1.5–4.5)
GLUCOSE SERPL-MCNC: 131 MG/DL (ref 70–99)
HBA1C MFR BLD: 8.3 % (ref 4.8–5.6)
HDLC SERPL-MCNC: 47 MG/DL
IMP & REVIEW OF LAB RESULTS: NORMAL
LDLC SERPL CALC-MCNC: 93 MG/DL (ref 0–99)
Lab: NORMAL
MICROALBUMIN UR-MCNC: 14.6 UG/ML
POTASSIUM SERPL-SCNC: 4.5 MMOL/L (ref 3.5–5.2)
PROT SERPL-MCNC: 7.2 G/DL (ref 6–8.5)
SODIUM SERPL-SCNC: 142 MMOL/L (ref 134–144)
TRIGL SERPL-MCNC: 89 MG/DL (ref 0–149)
VLDLC SERPL CALC-MCNC: 17 MG/DL (ref 5–40)

## 2024-11-03 SDOH — ECONOMIC STABILITY: INCOME INSECURITY: HOW HARD IS IT FOR YOU TO PAY FOR THE VERY BASICS LIKE FOOD, HOUSING, MEDICAL CARE, AND HEATING?: NOT HARD AT ALL

## 2024-11-03 SDOH — ECONOMIC STABILITY: FOOD INSECURITY: WITHIN THE PAST 12 MONTHS, YOU WORRIED THAT YOUR FOOD WOULD RUN OUT BEFORE YOU GOT MONEY TO BUY MORE.: NEVER TRUE

## 2024-11-03 SDOH — ECONOMIC STABILITY: FOOD INSECURITY: WITHIN THE PAST 12 MONTHS, THE FOOD YOU BOUGHT JUST DIDN'T LAST AND YOU DIDN'T HAVE MONEY TO GET MORE.: NEVER TRUE

## 2024-11-03 SDOH — ECONOMIC STABILITY: TRANSPORTATION INSECURITY
IN THE PAST 12 MONTHS, HAS LACK OF TRANSPORTATION KEPT YOU FROM MEETINGS, WORK, OR FROM GETTING THINGS NEEDED FOR DAILY LIVING?: NO

## 2024-11-04 ENCOUNTER — OFFICE VISIT (OUTPATIENT)
Age: 54
End: 2024-11-04
Payer: COMMERCIAL

## 2024-11-04 VITALS
BODY MASS INDEX: 27.36 KG/M2 | TEMPERATURE: 97.4 F | SYSTOLIC BLOOD PRESSURE: 112 MMHG | HEART RATE: 74 BPM | OXYGEN SATURATION: 98 % | HEIGHT: 72 IN | WEIGHT: 202 LBS | RESPIRATION RATE: 18 BRPM | DIASTOLIC BLOOD PRESSURE: 74 MMHG

## 2024-11-04 DIAGNOSIS — E11.9 TYPE 2 DIABETES MELLITUS WITHOUT COMPLICATION, WITHOUT LONG-TERM CURRENT USE OF INSULIN (HCC): Primary | ICD-10-CM

## 2024-11-04 DIAGNOSIS — E11.9 TYPE 2 DIABETES MELLITUS WITHOUT COMPLICATION, WITHOUT LONG-TERM CURRENT USE OF INSULIN (HCC): ICD-10-CM

## 2024-11-04 DIAGNOSIS — G89.29 CHRONIC PAIN OF RIGHT KNEE: ICD-10-CM

## 2024-11-04 DIAGNOSIS — E78.00 HYPERCHOLESTEROLEMIA: ICD-10-CM

## 2024-11-04 DIAGNOSIS — M25.561 CHRONIC PAIN OF RIGHT KNEE: ICD-10-CM

## 2024-11-04 DIAGNOSIS — M17.10 PATELLOFEMORAL ARTHRITIS: ICD-10-CM

## 2024-11-04 PROCEDURE — 3052F HG A1C>EQUAL 8.0%<EQUAL 9.0%: CPT | Performed by: INTERNAL MEDICINE

## 2024-11-04 PROCEDURE — 99214 OFFICE O/P EST MOD 30 MIN: CPT | Performed by: INTERNAL MEDICINE

## 2024-11-04 SDOH — HEALTH STABILITY: PHYSICAL HEALTH: ON AVERAGE, HOW MANY MINUTES DO YOU ENGAGE IN EXERCISE AT THIS LEVEL?: 30 MIN

## 2024-11-04 SDOH — HEALTH STABILITY: PHYSICAL HEALTH: ON AVERAGE, HOW MANY DAYS PER WEEK DO YOU ENGAGE IN MODERATE TO STRENUOUS EXERCISE (LIKE A BRISK WALK)?: 7 DAYS

## 2024-11-04 ASSESSMENT — ENCOUNTER SYMPTOMS
SHORTNESS OF BREATH: 0
COUGH: 0

## 2024-11-04 NOTE — PROGRESS NOTES
Jose Waddell Jr. is a 54 y.o. male  Chief Complaint   Patient presents with    6 Month Follow-Up    Knee Pain     Rt Knee , Referral        Vitals:    11/04/24 1126   BP: 112/74   Pulse: 74   Resp: 18   Temp: 97.4 °F (36.3 °C)   SpO2: 98%          HPI  Mr.Ronald SHRUTI Waddell Jr. 54-year-old man presenting for a follow-up on his chronic medical problems, particularly diabetes mellitus (DM). He reports ongoing right knee pain for the past 8 months, which has been aggravated by running, leading him to stop the activity. He notes tenderness to palpation around the medial side of the patella.  Recently, he had blood work done; his lipid levels are well-controlled, but his A1c remains elevated at 8.3. This is despite the addition of Jardiance and an increase in his Ozempic dose during his last visit, along with continuing 2 grams of Metformin. He indicates that his diet has not changed significantly, but he has not been exercising as he used to because of his knee pain.  His hypertension is reported to be well-controlled. A referral to orthopedics will be made for further evaluation of his knee pain.    .  Past Medical History:   Diagnosis Date    Acute superficial venous thrombosis of lower extremity 8/23/13    LLE, extensive to greater saphenous vein    Diabetes (HCC)     Genital HSV     Hypercholesterolemia           ROS  Review of Systems   Constitutional:  Negative for fever.   Respiratory:  Negative for cough and shortness of breath.    Cardiovascular:  Negative for chest pain and palpitations.   Neurological:  Negative for headaches.   Psychiatric/Behavioral:  Negative for dysphoric mood.            EXAM  Physical Exam  Vitals and nursing note reviewed.   HENT:      Head: Normocephalic and atraumatic.   Cardiovascular:      Rate and Rhythm: Normal rate and regular rhythm.      Pulses: Normal pulses.      Heart sounds: Normal heart sounds. No murmur heard.  Pulmonary:      Effort: Pulmonary effort is normal. No

## 2024-11-04 NOTE — PROGRESS NOTES
I have reviewed all needed documentation in preparation for visit. Verified patient by name and date of birth  Chief Complaint   Patient presents with    6 Month Follow-Up       There were no vitals filed for this visit.    Health Maintenance Due   Topic Date Due    Pneumococcal 0-64 years Vaccine (1 of 2 - PCV) Never done    Hepatitis B vaccine (1 of 3 - 19+ 3-dose series) Never done    Shingles vaccine (1 of 2) Never done    DTaP/Tdap/Td vaccine (2 - Td or Tdap) 08/05/2020    Diabetic foot exam  03/16/2021    Diabetic retinal exam  01/27/2023    Flu vaccine (1) Never done    COVID-19 Vaccine (2 - 2023-24 season) 09/01/2024     \"Have you been to the ER, urgent care clinic since your last visit?  Hospitalized since your last visit?\"    NO    “Have you seen or consulted any other health care providers outside of [facility] since your last visit?”    NO            Click Here for Release of Records Request         Kvng Tsang CCM

## 2024-11-11 RX ORDER — EMPAGLIFLOZIN 25 MG/1
25 TABLET, FILM COATED ORAL DAILY
Qty: 30 TABLET | Refills: 0 | Status: SHIPPED | OUTPATIENT
Start: 2024-11-11

## 2024-12-09 RX ORDER — PRAVASTATIN SODIUM 80 MG/1
80 TABLET ORAL NIGHTLY
Qty: 90 TABLET | Refills: 3 | Status: SHIPPED | OUTPATIENT
Start: 2024-12-09

## 2024-12-12 RX ORDER — EMPAGLIFLOZIN 25 MG/1
25 TABLET, FILM COATED ORAL DAILY
Qty: 30 TABLET | OUTPATIENT
Start: 2024-12-12

## 2024-12-20 RX ORDER — ASPIRIN 81 MG/1
TABLET, CHEWABLE ORAL
Qty: 90 TABLET | Refills: 3 | Status: SHIPPED | OUTPATIENT
Start: 2024-12-20

## 2025-02-02 SDOH — ECONOMIC STABILITY: FOOD INSECURITY: WITHIN THE PAST 12 MONTHS, YOU WORRIED THAT YOUR FOOD WOULD RUN OUT BEFORE YOU GOT MONEY TO BUY MORE.: NEVER TRUE

## 2025-02-02 SDOH — ECONOMIC STABILITY: FOOD INSECURITY: WITHIN THE PAST 12 MONTHS, THE FOOD YOU BOUGHT JUST DIDN'T LAST AND YOU DIDN'T HAVE MONEY TO GET MORE.: NEVER TRUE

## 2025-02-02 SDOH — ECONOMIC STABILITY: TRANSPORTATION INSECURITY
IN THE PAST 12 MONTHS, HAS THE LACK OF TRANSPORTATION KEPT YOU FROM MEDICAL APPOINTMENTS OR FROM GETTING MEDICATIONS?: NO

## 2025-02-02 SDOH — ECONOMIC STABILITY: INCOME INSECURITY: IN THE LAST 12 MONTHS, WAS THERE A TIME WHEN YOU WERE NOT ABLE TO PAY THE MORTGAGE OR RENT ON TIME?: NO

## 2025-02-03 LAB — HBA1C MFR BLD: 7.7 % (ref 4.8–5.6)

## 2025-02-04 LAB
BUN SERPL-MCNC: 11 MG/DL (ref 6–24)
BUN/CREAT SERPL: 11 (ref 9–20)
CALCIUM SERPL-MCNC: 9.6 MG/DL (ref 8.7–10.2)
CHLORIDE SERPL-SCNC: 100 MMOL/L (ref 96–106)
CO2 SERPL-SCNC: 24 MMOL/L (ref 20–29)
CREAT SERPL-MCNC: 0.99 MG/DL (ref 0.76–1.27)
EGFRCR SERPLBLD CKD-EPI 2021: 89 ML/MIN/1.73
GLUCOSE SERPL-MCNC: 132 MG/DL (ref 70–99)
POTASSIUM SERPL-SCNC: 4.5 MMOL/L (ref 3.5–5.2)
SODIUM SERPL-SCNC: 138 MMOL/L (ref 134–144)

## 2025-02-07 ENCOUNTER — OFFICE VISIT (OUTPATIENT)
Age: 55
End: 2025-02-07
Payer: COMMERCIAL

## 2025-02-07 VITALS
TEMPERATURE: 97.1 F | SYSTOLIC BLOOD PRESSURE: 110 MMHG | DIASTOLIC BLOOD PRESSURE: 70 MMHG | WEIGHT: 204 LBS | RESPIRATION RATE: 17 BRPM | HEART RATE: 100 BPM | OXYGEN SATURATION: 97 % | BODY MASS INDEX: 27.63 KG/M2 | HEIGHT: 72 IN

## 2025-02-07 DIAGNOSIS — E78.00 HYPERCHOLESTEROLEMIA: ICD-10-CM

## 2025-02-07 DIAGNOSIS — E11.9 TYPE 2 DIABETES MELLITUS WITHOUT COMPLICATION, WITHOUT LONG-TERM CURRENT USE OF INSULIN (HCC): Primary | ICD-10-CM

## 2025-02-07 DIAGNOSIS — E11.9 TYPE 2 DIABETES MELLITUS WITHOUT COMPLICATION, WITHOUT LONG-TERM CURRENT USE OF INSULIN (HCC): ICD-10-CM

## 2025-02-07 PROCEDURE — 3051F HG A1C>EQUAL 7.0%<8.0%: CPT | Performed by: INTERNAL MEDICINE

## 2025-02-07 PROCEDURE — 99214 OFFICE O/P EST MOD 30 MIN: CPT | Performed by: INTERNAL MEDICINE

## 2025-02-07 ASSESSMENT — PATIENT HEALTH QUESTIONNAIRE - PHQ9
SUM OF ALL RESPONSES TO PHQ QUESTIONS 1-9: 0
SUM OF ALL RESPONSES TO PHQ QUESTIONS 1-9: 0
2. FEELING DOWN, DEPRESSED OR HOPELESS: NOT AT ALL
SUM OF ALL RESPONSES TO PHQ9 QUESTIONS 1 & 2: 0
1. LITTLE INTEREST OR PLEASURE IN DOING THINGS: NOT AT ALL
SUM OF ALL RESPONSES TO PHQ QUESTIONS 1-9: 0
SUM OF ALL RESPONSES TO PHQ QUESTIONS 1-9: 0

## 2025-02-07 ASSESSMENT — ENCOUNTER SYMPTOMS
COUGH: 0
SHORTNESS OF BREATH: 0

## 2025-02-07 NOTE — PROGRESS NOTES
I have reviewed all needed documentation in preparation for visit. Verified patient by name and date of birth  Chief Complaint   Patient presents with    3 Month Follow-Up     No concerns        Vitals:    02/07/25 1118   BP: 110/70   Site: Right Upper Arm   Position: Sitting   Cuff Size: Medium Adult   Pulse: 100   Resp: 17   Temp: 97.1 °F (36.2 °C)   TempSrc: Temporal   SpO2: 97%   Weight: 92.5 kg (204 lb)   Height: 1.829 m (6')       Health Maintenance Due   Topic Date Due    Hepatitis B vaccine (1 of 3 - 19+ 3-dose series) Never done    Pneumococcal 50+ years Vaccine (1 of 2 - PCV) Never done    Shingles vaccine (1 of 2) Never done    DTaP/Tdap/Td vaccine (2 - Td or Tdap) 08/05/2020    Diabetic foot exam  03/16/2021    Diabetic retinal exam  01/27/2023    Flu vaccine (1) Never done    COVID-19 Vaccine (2 - 2024-25 season) 09/01/2024     \"Have you been to the ER, urgent care clinic since your last visit?  Hospitalized since your last visit?\"    NO    “Have you seen or consulted any other health care providers outside of [facility] since your last visit?”    NO            Click Here for Release of Records Request         Benita humphries CMA

## 2025-02-07 NOTE — PROGRESS NOTES
Jose Waddell Jr. is a 54 y.o. male  Chief Complaint   Patient presents with    3 Month Follow-Up     No concerns        Vitals:    02/07/25 1118   BP: 110/70   Pulse: 100   Resp: 17   Temp: 97.1 °F (36.2 °C)   SpO2: 97%          HPI  Mr. Jose Waddell Jr. is a 54-year-old with a history of diabetes and HLD, presents to the clinic for follow-up on his diabetes management. During his last visit, his semaglutide dose was increased to 2 mg weekly, and he has been more disciplined with his diet and exercise. His recent blood work shows improvement, with his A1c decreasing from 8.3 to 7.7 and a glucose of 132 on his BMP and everything else is normal.  The plan is to continue with lifestyle modifications and reassess at his next visit in 3 months. His knee pain has also improved since the last visit, likely due to his increased physical activity, now exercising every other day, though less intensely than before. He denies any symptoms of chest pain, shortness of breath, or dizziness.        .  Past Medical History:   Diagnosis Date    Acute superficial venous thrombosis of lower extremity 8/23/13    LLE, extensive to greater saphenous vein    Diabetes (HCC)     Genital HSV     Hypercholesterolemia             ROS  Review of Systems   Constitutional:  Negative for fever.   Respiratory:  Negative for cough and shortness of breath.    Cardiovascular:  Negative for chest pain and palpitations.   Neurological:  Negative for headaches.   Psychiatric/Behavioral:  Negative for dysphoric mood.            EXAM  Physical Exam  Vitals and nursing note reviewed.   HENT:      Head: Normocephalic and atraumatic.   Cardiovascular:      Rate and Rhythm: Normal rate and regular rhythm.      Pulses: Normal pulses.      Heart sounds: Normal heart sounds. No murmur heard.  Pulmonary:      Effort: Pulmonary effort is normal. No respiratory distress.      Breath sounds: Normal breath sounds.   Abdominal:      Palpations: There is no

## 2025-02-27 DIAGNOSIS — E11.9 TYPE 2 DIABETES MELLITUS WITHOUT COMPLICATION, WITHOUT LONG-TERM CURRENT USE OF INSULIN (HCC): ICD-10-CM

## 2025-03-04 ENCOUNTER — TELEPHONE (OUTPATIENT)
Age: 55
End: 2025-03-04

## 2025-03-04 NOTE — TELEPHONE ENCOUNTER
Key: [id number] for semaglutide, 2 MG/DOSE, (OZEMPIC) 8 MG/3ML SOPN sc injection has been submitted,ABEL Yang,  stated claim is pending review,decision will be fax to office within five business days.Case ID: [id number].Please review when received.Thanks

## 2025-03-10 RX ORDER — EMPAGLIFLOZIN 25 MG/1
25 TABLET, FILM COATED ORAL DAILY
Qty: 90 TABLET | Refills: 0 | Status: SHIPPED | OUTPATIENT
Start: 2025-03-10

## 2025-03-11 RX ORDER — PRAVASTATIN SODIUM 80 MG/1
80 TABLET ORAL NIGHTLY
Qty: 90 TABLET | Refills: 3 | Status: SHIPPED | OUTPATIENT
Start: 2025-03-11

## 2025-03-12 RX ORDER — GLIMEPIRIDE 4 MG/1
4 TABLET ORAL
Qty: 90 TABLET | Refills: 3 | Status: SHIPPED | OUTPATIENT
Start: 2025-03-12

## 2025-05-06 LAB
ALBUMIN SERPL-MCNC: 4.8 G/DL (ref 3.8–4.9)
ALP SERPL-CCNC: 59 IU/L (ref 44–121)
ALT SERPL-CCNC: 16 IU/L (ref 0–44)
AST SERPL-CCNC: 18 IU/L (ref 0–40)
BILIRUB SERPL-MCNC: 1 MG/DL (ref 0–1.2)
BUN SERPL-MCNC: 14 MG/DL (ref 6–24)
BUN/CREAT SERPL: 13 (ref 9–20)
CALCIUM SERPL-MCNC: 9.6 MG/DL (ref 8.7–10.2)
CHLORIDE SERPL-SCNC: 100 MMOL/L (ref 96–106)
CHOLEST SERPL-MCNC: 144 MG/DL (ref 100–199)
CO2 SERPL-SCNC: 20 MMOL/L (ref 20–29)
CREAT SERPL-MCNC: 1.08 MG/DL (ref 0.76–1.27)
EGFRCR SERPLBLD CKD-EPI 2021: 81 ML/MIN/1.73
GLOBULIN SER CALC-MCNC: 2.3 G/DL (ref 1.5–4.5)
GLUCOSE SERPL-MCNC: 113 MG/DL (ref 70–99)
HBA1C MFR BLD: 7.5 % (ref 4.8–5.6)
HDLC SERPL-MCNC: 48 MG/DL
LDLC SERPL CALC-MCNC: 80 MG/DL (ref 0–99)
POTASSIUM SERPL-SCNC: 4.4 MMOL/L (ref 3.5–5.2)
PROT SERPL-MCNC: 7.1 G/DL (ref 6–8.5)
SODIUM SERPL-SCNC: 139 MMOL/L (ref 134–144)
TRIGL SERPL-MCNC: 84 MG/DL (ref 0–149)
VLDLC SERPL CALC-MCNC: 16 MG/DL (ref 5–40)

## 2025-05-07 ENCOUNTER — RESULTS FOLLOW-UP (OUTPATIENT)
Age: 55
End: 2025-05-07

## 2025-05-07 ENCOUNTER — OFFICE VISIT (OUTPATIENT)
Age: 55
End: 2025-05-07
Payer: COMMERCIAL

## 2025-05-07 VITALS
DIASTOLIC BLOOD PRESSURE: 70 MMHG | BODY MASS INDEX: 26.82 KG/M2 | HEIGHT: 72 IN | WEIGHT: 198 LBS | TEMPERATURE: 97.4 F | RESPIRATION RATE: 16 BRPM | HEART RATE: 91 BPM | OXYGEN SATURATION: 98 % | SYSTOLIC BLOOD PRESSURE: 109 MMHG

## 2025-05-07 DIAGNOSIS — E78.00 HYPERCHOLESTEROLEMIA: ICD-10-CM

## 2025-05-07 DIAGNOSIS — E11.9 TYPE 2 DIABETES MELLITUS WITHOUT COMPLICATION, WITHOUT LONG-TERM CURRENT USE OF INSULIN (HCC): Primary | ICD-10-CM

## 2025-05-07 DIAGNOSIS — Z12.5 SCREENING FOR PROSTATE CANCER: ICD-10-CM

## 2025-05-07 DIAGNOSIS — E11.9 TYPE 2 DIABETES MELLITUS WITHOUT COMPLICATION, WITHOUT LONG-TERM CURRENT USE OF INSULIN (HCC): ICD-10-CM

## 2025-05-07 LAB
ALBUMIN/CREAT UR: 13 MG/G CREAT (ref 0–29)
CREAT UR-MCNC: 116.9 MG/DL
IMP & REVIEW OF LAB RESULTS: NORMAL
Lab: NORMAL
Lab: NORMAL
MICROALBUMIN UR-MCNC: 15.7 UG/ML
SPECIMEN STATUS REPORT: NORMAL

## 2025-05-07 PROCEDURE — 3051F HG A1C>EQUAL 7.0%<8.0%: CPT | Performed by: INTERNAL MEDICINE

## 2025-05-07 PROCEDURE — 99214 OFFICE O/P EST MOD 30 MIN: CPT | Performed by: INTERNAL MEDICINE

## 2025-05-07 RX ORDER — GLIMEPIRIDE 4 MG/1
6 TABLET ORAL
Qty: 90 TABLET | Refills: 3 | Status: SHIPPED
Start: 2025-05-07

## 2025-05-07 ASSESSMENT — ENCOUNTER SYMPTOMS
COUGH: 0
SHORTNESS OF BREATH: 0

## 2025-05-07 NOTE — PROGRESS NOTES
Jose Waddell Jr. is a 54 y.o. male  Chief Complaint   Patient presents with    Established New Doctor     3 month follow up .    Immunizations     Zoster Vaccine  .       Vitals:    05/07/25 1119   BP: 109/70   Pulse: 91   Resp: 16   Temp: 97.4 °F (36.3 °C)   SpO2: 98%          HPI  Treatment Adherence:   Medication compliance:  compliant all of the time  Diet compliance:  compliant all of the time  Weight trend: decreasing  Current exercise: aerobics 3 time(s) per week and weight training  3 time(s) per week  Barriers: none    Diabetes Mellitus Type 2: Current symptoms/problems include none.    Home blood sugar records: patient does not test  Any episodes of hypoglycemia? No, he knows when he has symptoms of hypoglycemia  Eye exam current (within one year): yes  Tobacco history: He  reports that he has never smoked. He has never been exposed to tobacco smoke. He has never used smokeless tobacco.   Daily Aspirin? No:       Hyperlipidemia:  No new myalgias or GI upset on pravastatin (Pravachol).       Lab Results   Component Value Date    LABA1C 7.5 (H) 05/05/2025    LABA1C 7.7 (H) 02/03/2025    LABA1C 8.3 (H) 10/30/2024     Lab Results   Component Value Date    CREATININE 1.08 05/05/2025     Lab Results   Component Value Date    ALT 16 05/05/2025    AST 18 05/05/2025     Lab Results   Component Value Date    CHOL 144 05/05/2025    TRIG 84 05/05/2025    HDL 48 05/05/2025                  .  Past Medical History:   Diagnosis Date    Acute superficial venous thrombosis of lower extremity 8/23/13    LLE, extensive to greater saphenous vein    Diabetes (HCC)     Genital HSV     Hypercholesterolemia             ROS  Review of Systems   Constitutional:  Negative for fever.   Respiratory:  Negative for cough and shortness of breath.    Cardiovascular:  Negative for chest pain and palpitations.   Neurological:  Negative for headaches.   Psychiatric/Behavioral:  Negative for dysphoric mood.            EXAM  Physical

## 2025-05-07 NOTE — PROGRESS NOTES
I have reviewed all needed documentation in preparation for visit. Verified patient by name and date of birth  Chief Complaint   Patient presents with    Established New Doctor     3 month follow up .       There were no vitals filed for this visit.    Health Maintenance Due   Topic Date Due    Hepatitis B vaccine (1 of 3 - 19+ 3-dose series) Never done    Pneumococcal 50+ years Vaccine (1 of 2 - PCV) Never done    Shingles vaccine (1 of 2) Never done    DTaP/Tdap/Td vaccine (2 - Td or Tdap) 08/05/2020    Diabetic foot exam  03/16/2021    Diabetic retinal exam  01/27/2023    COVID-19 Vaccine (2 - 2024-25 season) 09/01/2024     \"Have you been to the ER, urgent care clinic since your last visit?  Hospitalized since your last visit?\"    NO    “Have you seen or consulted any other health care providers outside of [facility] since your last visit?”    NO          Click Here for Release of Records Request         Kvng Tsang [facility]

## 2025-05-08 LAB — C PEPTIDE SERPL-MCNC: 1.3 NG/ML (ref 1.1–4.4)

## 2025-05-29 DIAGNOSIS — E11.9 TYPE 2 DIABETES MELLITUS WITHOUT COMPLICATION, WITHOUT LONG-TERM CURRENT USE OF INSULIN (HCC): ICD-10-CM

## 2025-05-29 RX ORDER — GLIMEPIRIDE 4 MG/1
6 TABLET ORAL
Qty: 90 TABLET | Refills: 3 | Status: SHIPPED | OUTPATIENT
Start: 2025-05-29

## 2025-05-30 DIAGNOSIS — E11.9 TYPE 2 DIABETES MELLITUS WITHOUT COMPLICATION, WITHOUT LONG-TERM CURRENT USE OF INSULIN (HCC): ICD-10-CM

## 2025-05-30 NOTE — TELEPHONE ENCOUNTER
Refill request received from [facility] for   Requested Prescriptions     Pending Prescriptions Disp Refills    semaglutide, 2 MG/DOSE, (OZEMPIC) 8 MG/3ML SOPN sc injection 9 mL 0     Sig: Inject 2 mg into the skin every 7 days     Last office visit: 5/7/2025   Next office visit: 8/7/2025     Routed to Dr Harley Zelaya for review.

## 2025-06-19 RX ORDER — METFORMIN HYDROCHLORIDE 500 MG/1
TABLET, EXTENDED RELEASE ORAL
Qty: 360 TABLET | Refills: 3 | Status: SHIPPED | OUTPATIENT
Start: 2025-06-19

## 2025-06-19 NOTE — TELEPHONE ENCOUNTER
Refill request received from [facility] for   Requested Prescriptions     Pending Prescriptions Disp Refills    metFORMIN (GLUCOPHAGE-XR) 500 MG extended release tablet 360 tablet 3     Last office visit: 5/7/2025   Next office visit: 8/7/2025     Routed to Dr Harley Zelaya for review.

## 2025-08-06 LAB
ALBUMIN SERPL-MCNC: 4.7 G/DL (ref 3.8–4.9)
ALP SERPL-CCNC: 57 IU/L (ref 44–121)
ALT SERPL-CCNC: 14 IU/L (ref 0–44)
AST SERPL-CCNC: 15 IU/L (ref 0–40)
BILIRUB SERPL-MCNC: 0.9 MG/DL (ref 0–1.2)
BUN SERPL-MCNC: 11 MG/DL (ref 6–24)
BUN/CREAT SERPL: 12 (ref 9–20)
CALCIUM SERPL-MCNC: 9.5 MG/DL (ref 8.7–10.2)
CHLORIDE SERPL-SCNC: 100 MMOL/L (ref 96–106)
CO2 SERPL-SCNC: 24 MMOL/L (ref 20–29)
CREAT SERPL-MCNC: 0.89 MG/DL (ref 0.76–1.27)
EGFRCR SERPLBLD CKD-EPI 2021: 100 ML/MIN/1.73
GLOBULIN SER CALC-MCNC: 2.4 G/DL (ref 1.5–4.5)
GLUCOSE SERPL-MCNC: 109 MG/DL (ref 70–99)
HBA1C MFR BLD: 7.1 % (ref 4.8–5.6)
POTASSIUM SERPL-SCNC: 4.4 MMOL/L (ref 3.5–5.2)
PROT SERPL-MCNC: 7.1 G/DL (ref 6–8.5)
PSA SERPL-MCNC: 1.8 NG/ML (ref 0–4)
SODIUM SERPL-SCNC: 139 MMOL/L (ref 134–144)

## 2025-08-07 ENCOUNTER — OFFICE VISIT (OUTPATIENT)
Age: 55
End: 2025-08-07
Payer: COMMERCIAL

## 2025-08-07 VITALS
HEIGHT: 72 IN | WEIGHT: 201.8 LBS | DIASTOLIC BLOOD PRESSURE: 71 MMHG | HEART RATE: 78 BPM | TEMPERATURE: 98.2 F | RESPIRATION RATE: 18 BRPM | BODY MASS INDEX: 27.33 KG/M2 | OXYGEN SATURATION: 95 % | SYSTOLIC BLOOD PRESSURE: 103 MMHG

## 2025-08-07 DIAGNOSIS — E78.00 HYPERCHOLESTEROLEMIA: ICD-10-CM

## 2025-08-07 DIAGNOSIS — E11.9 TYPE 2 DIABETES MELLITUS WITHOUT COMPLICATION, WITHOUT LONG-TERM CURRENT USE OF INSULIN (HCC): Primary | ICD-10-CM

## 2025-08-07 PROCEDURE — 3051F HG A1C>EQUAL 7.0%<8.0%: CPT | Performed by: INTERNAL MEDICINE

## 2025-08-07 PROCEDURE — 99214 OFFICE O/P EST MOD 30 MIN: CPT | Performed by: INTERNAL MEDICINE

## 2025-08-07 ASSESSMENT — ENCOUNTER SYMPTOMS
COUGH: 0
SHORTNESS OF BREATH: 0

## 2025-08-23 DIAGNOSIS — E11.9 TYPE 2 DIABETES MELLITUS WITHOUT COMPLICATION, WITHOUT LONG-TERM CURRENT USE OF INSULIN (HCC): ICD-10-CM

## 2025-08-25 RX ORDER — GLIMEPIRIDE 4 MG/1
6 TABLET ORAL
Qty: 90 TABLET | Refills: 3 | Status: SHIPPED | OUTPATIENT
Start: 2025-08-25

## 2025-08-25 RX ORDER — METFORMIN HYDROCHLORIDE 500 MG/1
TABLET, EXTENDED RELEASE ORAL
Qty: 360 TABLET | Refills: 3 | Status: SHIPPED | OUTPATIENT
Start: 2025-08-25